# Patient Record
Sex: FEMALE | Race: WHITE | NOT HISPANIC OR LATINO | Employment: UNEMPLOYED | URBAN - METROPOLITAN AREA
[De-identification: names, ages, dates, MRNs, and addresses within clinical notes are randomized per-mention and may not be internally consistent; named-entity substitution may affect disease eponyms.]

---
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
## 2022-09-19 ENCOUNTER — LAB REQUISITION (OUTPATIENT)
Dept: LAB | Facility: CLINIC | Age: 22
End: 2022-09-19
Payer: COMMERCIAL

## 2022-09-19 DIAGNOSIS — N89.8 OTHER SPECIFIED NONINFLAMMATORY DISORDERS OF VAGINA: ICD-10-CM

## 2022-09-19 PROCEDURE — 87491 CHLMYD TRACH DNA AMP PROBE: CPT | Mod: ORL | Performed by: OBSTETRICS & GYNECOLOGY

## 2022-09-20 LAB
C TRACH DNA SPEC QL PROBE+SIG AMP: NEGATIVE
N GONORRHOEA DNA SPEC QL NAA+PROBE: NEGATIVE

## 2023-04-18 ENCOUNTER — HOSPITAL ENCOUNTER (EMERGENCY)
Facility: HOSPITAL | Age: 23
Discharge: HOME | End: 2023-04-19
Attending: EMERGENCY MEDICINE | Admitting: EMERGENCY MEDICINE
Payer: COMMERCIAL

## 2023-04-18 ENCOUNTER — NURSE TRIAGE (OUTPATIENT)
Dept: NURSING | Facility: CLINIC | Age: 23
End: 2023-04-18
Payer: COMMERCIAL

## 2023-04-18 ENCOUNTER — APPOINTMENT (OUTPATIENT)
Dept: CT IMAGING | Facility: HOSPITAL | Age: 23
End: 2023-04-18
Attending: EMERGENCY MEDICINE
Payer: COMMERCIAL

## 2023-04-18 VITALS
WEIGHT: 175 LBS | HEART RATE: 76 BPM | RESPIRATION RATE: 17 BRPM | SYSTOLIC BLOOD PRESSURE: 124 MMHG | DIASTOLIC BLOOD PRESSURE: 83 MMHG | TEMPERATURE: 98.1 F | OXYGEN SATURATION: 98 %

## 2023-04-18 DIAGNOSIS — I10 EPISODE OF HYPERTENSION: ICD-10-CM

## 2023-04-18 DIAGNOSIS — G43.909 MIGRAINE WITHOUT STATUS MIGRAINOSUS, NOT INTRACTABLE, UNSPECIFIED MIGRAINE TYPE: ICD-10-CM

## 2023-04-18 PROBLEM — J30.2 SEASONAL ALLERGIC RHINITIS: Status: ACTIVE | Noted: 2023-04-18

## 2023-04-18 PROBLEM — F43.11 ACUTE POST-TRAUMATIC STRESS DISORDER: Status: ACTIVE | Noted: 2023-04-18

## 2023-04-18 PROBLEM — N94.6 DYSMENORRHEA: Status: ACTIVE | Noted: 2023-04-18

## 2023-04-18 PROBLEM — F32.1 MODERATE MAJOR DEPRESSION, SINGLE EPISODE (H): Status: ACTIVE | Noted: 2023-04-18

## 2023-04-18 PROBLEM — F45.1: Status: ACTIVE | Noted: 2021-07-16

## 2023-04-18 PROBLEM — F93.0 SEPARATION ANXIETY DISORDER: Status: ACTIVE | Noted: 2021-08-09

## 2023-04-18 PROBLEM — F45.9 PSYCHOSOMATIC DISORDER: Status: ACTIVE | Noted: 2023-04-18

## 2023-04-18 PROBLEM — F33.2 SEVERE EPISODE OF RECURRENT MAJOR DEPRESSIVE DISORDER, WITHOUT PSYCHOTIC FEATURES (H): Status: ACTIVE | Noted: 2021-05-05

## 2023-04-18 PROBLEM — R44.3 HALLUCINATIONS: Status: ACTIVE | Noted: 2023-04-18

## 2023-04-18 PROBLEM — F44.5 CONVERSION DISORDER WITH SEIZURES OR CONVULSIONS: Status: ACTIVE | Noted: 2022-08-05

## 2023-04-18 PROBLEM — F60.9 PERSONALITY DISORDER (H): Status: ACTIVE | Noted: 2021-07-15

## 2023-04-18 PROBLEM — G47.30 SLEEP APNEA: Status: ACTIVE | Noted: 2023-04-18

## 2023-04-18 PROBLEM — Z91.51 HISTORY OF ATTEMPTED SUICIDE: Status: ACTIVE | Noted: 2023-04-18

## 2023-04-18 PROBLEM — F33.1 MODERATE EPISODE OF RECURRENT MAJOR DEPRESSIVE DISORDER (H): Status: ACTIVE | Noted: 2022-09-08

## 2023-04-18 LAB
ALBUMIN SERPL BCG-MCNC: 3.8 G/DL (ref 3.5–5.2)
ALP SERPL-CCNC: 70 U/L (ref 35–104)
ALT SERPL W P-5'-P-CCNC: 29 U/L (ref 10–35)
ANION GAP SERPL CALCULATED.3IONS-SCNC: 8 MMOL/L (ref 7–15)
AST SERPL W P-5'-P-CCNC: 23 U/L (ref 10–35)
BASOPHILS # BLD AUTO: 0 10E3/UL (ref 0–0.2)
BASOPHILS NFR BLD AUTO: 0 %
BILIRUB SERPL-MCNC: <0.2 MG/DL
BUN SERPL-MCNC: 10.8 MG/DL (ref 6–20)
CALCIUM SERPL-MCNC: 8.9 MG/DL (ref 8.6–10)
CHLORIDE SERPL-SCNC: 105 MMOL/L (ref 98–107)
CREAT SERPL-MCNC: 0.77 MG/DL (ref 0.51–0.95)
DEPRECATED HCO3 PLAS-SCNC: 27 MMOL/L (ref 22–29)
EOSINOPHIL # BLD AUTO: 0.1 10E3/UL (ref 0–0.7)
EOSINOPHIL NFR BLD AUTO: 2 %
ERYTHROCYTE [DISTWIDTH] IN BLOOD BY AUTOMATED COUNT: 14.1 % (ref 10–15)
GFR SERPL CREATININE-BSD FRML MDRD: >90 ML/MIN/1.73M2
GLUCOSE SERPL-MCNC: 100 MG/DL (ref 70–99)
HCG SERPL QL: NEGATIVE
HCT VFR BLD AUTO: 43 % (ref 35–47)
HGB BLD-MCNC: 14 G/DL (ref 11.7–15.7)
IMM GRANULOCYTES # BLD: 0 10E3/UL
IMM GRANULOCYTES NFR BLD: 0 %
LYMPHOCYTES # BLD AUTO: 3 10E3/UL (ref 0.8–5.3)
LYMPHOCYTES NFR BLD AUTO: 40 %
MCH RBC QN AUTO: 28.5 PG (ref 26.5–33)
MCHC RBC AUTO-ENTMCNC: 32.6 G/DL (ref 31.5–36.5)
MCV RBC AUTO: 88 FL (ref 78–100)
MONOCYTES # BLD AUTO: 0.6 10E3/UL (ref 0–1.3)
MONOCYTES NFR BLD AUTO: 8 %
NEUTROPHILS # BLD AUTO: 3.7 10E3/UL (ref 1.6–8.3)
NEUTROPHILS NFR BLD AUTO: 50 %
NRBC # BLD AUTO: 0 10E3/UL
NRBC BLD AUTO-RTO: 0 /100
PLATELET # BLD AUTO: 489 10E3/UL (ref 150–450)
POTASSIUM SERPL-SCNC: 4.2 MMOL/L (ref 3.4–5.3)
PROT SERPL-MCNC: 6.8 G/DL (ref 6.4–8.3)
RBC # BLD AUTO: 4.91 10E6/UL (ref 3.8–5.2)
SODIUM SERPL-SCNC: 140 MMOL/L (ref 136–145)
TROPONIN T SERPL HS-MCNC: <6 NG/L
WBC # BLD AUTO: 7.5 10E3/UL (ref 4–11)

## 2023-04-18 PROCEDURE — 96374 THER/PROPH/DIAG INJ IV PUSH: CPT

## 2023-04-18 PROCEDURE — 36415 COLL VENOUS BLD VENIPUNCTURE: CPT | Performed by: EMERGENCY MEDICINE

## 2023-04-18 PROCEDURE — 84484 ASSAY OF TROPONIN QUANT: CPT | Performed by: EMERGENCY MEDICINE

## 2023-04-18 PROCEDURE — 99285 EMERGENCY DEPT VISIT HI MDM: CPT | Mod: 25

## 2023-04-18 PROCEDURE — 250N000011 HC RX IP 250 OP 636: Performed by: EMERGENCY MEDICINE

## 2023-04-18 PROCEDURE — 80053 COMPREHEN METABOLIC PANEL: CPT | Performed by: EMERGENCY MEDICINE

## 2023-04-18 PROCEDURE — 85025 COMPLETE CBC W/AUTO DIFF WBC: CPT | Performed by: EMERGENCY MEDICINE

## 2023-04-18 PROCEDURE — 70450 CT HEAD/BRAIN W/O DYE: CPT

## 2023-04-18 PROCEDURE — 96375 TX/PRO/DX INJ NEW DRUG ADDON: CPT

## 2023-04-18 PROCEDURE — 93005 ELECTROCARDIOGRAM TRACING: CPT | Performed by: EMERGENCY MEDICINE

## 2023-04-18 PROCEDURE — 84703 CHORIONIC GONADOTROPIN ASSAY: CPT | Performed by: EMERGENCY MEDICINE

## 2023-04-18 RX ORDER — KETOROLAC TROMETHAMINE 15 MG/ML
15 INJECTION, SOLUTION INTRAMUSCULAR; INTRAVENOUS ONCE
Status: COMPLETED | OUTPATIENT
Start: 2023-04-18 | End: 2023-04-18

## 2023-04-18 RX ORDER — METOCLOPRAMIDE HYDROCHLORIDE 5 MG/ML
5 INJECTION INTRAMUSCULAR; INTRAVENOUS ONCE
Status: COMPLETED | OUTPATIENT
Start: 2023-04-18 | End: 2023-04-18

## 2023-04-18 RX ORDER — DIPHENHYDRAMINE HYDROCHLORIDE 50 MG/ML
12.5 INJECTION INTRAMUSCULAR; INTRAVENOUS ONCE
Status: COMPLETED | OUTPATIENT
Start: 2023-04-18 | End: 2023-04-18

## 2023-04-18 RX ADMIN — METOCLOPRAMIDE 5 MG: 5 INJECTION, SOLUTION INTRAMUSCULAR; INTRAVENOUS at 22:15

## 2023-04-18 RX ADMIN — KETOROLAC TROMETHAMINE 15 MG: 15 INJECTION, SOLUTION INTRAMUSCULAR; INTRAVENOUS at 22:16

## 2023-04-18 RX ADMIN — DIPHENHYDRAMINE HYDROCHLORIDE 12.5 MG: 50 INJECTION, SOLUTION INTRAMUSCULAR; INTRAVENOUS at 22:16

## 2023-04-18 ASSESSMENT — ENCOUNTER SYMPTOMS
PHOTOPHOBIA: 1
NUMBNESS: 1
HEADACHES: 1

## 2023-04-18 ASSESSMENT — ACTIVITIES OF DAILY LIVING (ADL): ADLS_ACUITY_SCORE: 35
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
## 2023-04-19 NOTE — TELEPHONE ENCOUNTER
Mary and her mom calling with B/P 210/145 taken 3 times at her group home and pulse 145. She also has a severe headache and blurred vision.  Care advice is to go to the ED now. Mom and Mary are already at the [facility].      Kristin Burns RN on 4/18/2023 at 9:25 PM      Reason for Disposition    [1] Systolic BP  >= 160 OR Diastolic >= 100 AND [2] cardiac or neurologic symptoms (e.g., chest pain, difficulty breathing, unsteady gait, blurred vision)    Additional Information    Negative: Difficult to awaken or acting confused (e.g., disoriented, slurred speech)    Negative: SEVERE difficulty breathing (e.g., struggling for each breath, speaks in single words)    Negative: [1] Weakness of the face, arm or leg on one side of the body AND [2] new-onset    Negative: [1] Numbness (i.e., loss of sensation) of the face, arm or leg on one side of the body AND [2] new-onset    Negative: [1] Chest pain lasts > 5 minutes AND [2] history of heart disease (i.e., heart attack, bypass surgery, angina, angioplasty, CHF)    Negative: [1] Chest pain AND [2] took nitrogylcerin AND [3] pain was not relieved    Negative: Sounds like a life-threatening emergency to the triager    Protocols used: BLOOD PRESSURE - HIGH-A-

## 2023-04-19 NOTE — DISCHARGE INSTRUCTIONS
Recommend continue your regular at home medications and monitoring of blood pressure at bedtime as usual.  Presently your blood pressures have been within normal limits in the ER and I would not consider medications for blood pressure as it feels likely related to the headache and pain response.

## 2023-04-19 NOTE — ED TRIAGE NOTES
Patient comes in with mother after having had a syncopal episode at her group home. Was sitting in the chair at the time and fell to the floor, hitting her head. Hypertensive at the time. Reports 9/10 headache, some L anterior chest pain and LLE numbness/ tingling. Patient has history of frequent syncopal spells, being evaluated at [place] for cause. Has had x2 syncopal episodes while in waiting room and triage. Lasting from 30 seconds-2 minutes.      Triage Assessment     Row Name 04/18/23 5771       Triage Assessment (Adult)    Airway WDL WDL       Respiratory WDL    Respiratory WDL WDL       Cardiac WDL    Cardiac WDL X;chest pain       Chest Pain Assessment    Chest Pain Location anterior chest, left

## 2023-04-20 LAB
ATRIAL RATE - MUSE: 70 BPM
DIASTOLIC BLOOD PRESSURE - MUSE: 83 MMHG
INTERPRETATION ECG - MUSE: NORMAL
P AXIS - MUSE: 16 DEGREES
PR INTERVAL - MUSE: 148 MS
QRS DURATION - MUSE: 92 MS
QT - MUSE: 392 MS
QTC - MUSE: 423 MS
R AXIS - MUSE: 13 DEGREES
SYSTOLIC BLOOD PRESSURE - MUSE: 124 MMHG
T AXIS - MUSE: 2 DEGREES
VENTRICULAR RATE- MUSE: 70 BPM

## 2023-06-02 ENCOUNTER — HEALTH MAINTENANCE LETTER (OUTPATIENT)
Age: 23
End: 2023-06-02

## 2024-02-01 ENCOUNTER — TRANSFERRED RECORDS (OUTPATIENT)
Dept: MULTI SPECIALTY CLINIC | Facility: CLINIC | Age: 24
End: 2024-02-01

## 2024-02-01 LAB
CHLAMYDIA - HIM PATIENT REPORTED: NORMAL
PAP SMEAR - HIM PATIENT REPORTED: NORMAL

## 2024-03-08 ENCOUNTER — OFFICE VISIT (OUTPATIENT)
Dept: FAMILY MEDICINE | Facility: CLINIC | Age: 24
End: 2024-03-08
Payer: COMMERCIAL

## 2024-03-08 VITALS
WEIGHT: 189 LBS | TEMPERATURE: 98 F | BODY MASS INDEX: 34.78 KG/M2 | HEIGHT: 62 IN | DIASTOLIC BLOOD PRESSURE: 70 MMHG | OXYGEN SATURATION: 98 % | HEART RATE: 93 BPM | RESPIRATION RATE: 15 BRPM | SYSTOLIC BLOOD PRESSURE: 96 MMHG

## 2024-03-08 DIAGNOSIS — B37.31 CANDIDIASIS OF VAGINA: ICD-10-CM

## 2024-03-08 DIAGNOSIS — Z13.220 SCREENING FOR HYPERLIPIDEMIA: ICD-10-CM

## 2024-03-08 DIAGNOSIS — Z00.00 ROUTINE GENERAL MEDICAL EXAMINATION AT A HEALTH CARE FACILITY: Primary | ICD-10-CM

## 2024-03-08 DIAGNOSIS — E66.01 CLASS 2 SEVERE OBESITY DUE TO EXCESS CALORIES WITH SERIOUS COMORBIDITY AND BODY MASS INDEX (BMI) OF 36.0 TO 36.9 IN ADULT (H): ICD-10-CM

## 2024-03-08 DIAGNOSIS — N62 HYPERTROPHY OF BREAST: ICD-10-CM

## 2024-03-08 DIAGNOSIS — E66.812 CLASS 2 SEVERE OBESITY DUE TO EXCESS CALORIES WITH SERIOUS COMORBIDITY AND BODY MASS INDEX (BMI) OF 36.0 TO 36.9 IN ADULT (H): ICD-10-CM

## 2024-03-08 DIAGNOSIS — Z13.1 ENCOUNTER FOR SCREENING FOR DIABETES MELLITUS: ICD-10-CM

## 2024-03-08 DIAGNOSIS — Z76.89 ENCOUNTER TO ESTABLISH CARE WITH NEW DOCTOR: ICD-10-CM

## 2024-03-08 DIAGNOSIS — F33.1 MODERATE EPISODE OF RECURRENT MAJOR DEPRESSIVE DISORDER (H): ICD-10-CM

## 2024-03-08 DIAGNOSIS — N94.6 DYSMENORRHEA: ICD-10-CM

## 2024-03-08 DIAGNOSIS — K21.00 GASTROESOPHAGEAL REFLUX DISEASE WITH ESOPHAGITIS, UNSPECIFIED WHETHER HEMORRHAGE: ICD-10-CM

## 2024-03-08 DIAGNOSIS — N89.8 VAGINAL DISCHARGE: ICD-10-CM

## 2024-03-08 LAB
CLUE CELLS: ABNORMAL
ERYTHROCYTE [DISTWIDTH] IN BLOOD BY AUTOMATED COUNT: 14 % (ref 10–15)
HBA1C MFR BLD: 5 % (ref 0–5.6)
HCT VFR BLD AUTO: 38.4 % (ref 35–47)
HGB BLD-MCNC: 12.7 G/DL (ref 11.7–15.7)
MCH RBC QN AUTO: 27.9 PG (ref 26.5–33)
MCHC RBC AUTO-ENTMCNC: 33.1 G/DL (ref 31.5–36.5)
MCV RBC AUTO: 84 FL (ref 78–100)
PLATELET # BLD AUTO: 412 10E3/UL (ref 150–450)
RBC # BLD AUTO: 4.55 10E6/UL (ref 3.8–5.2)
TRICHOMONAS, WET PREP: ABNORMAL
WBC # BLD AUTO: 9 10E3/UL (ref 4–11)
WBC'S/HIGH POWER FIELD, WET PREP: ABNORMAL
YEAST, WET PREP: PRESENT

## 2024-03-08 PROCEDURE — 99000 SPECIMEN HANDLING OFFICE-LAB: CPT | Performed by: NURSE PRACTITIONER

## 2024-03-08 PROCEDURE — 82306 VITAMIN D 25 HYDROXY: CPT | Performed by: NURSE PRACTITIONER

## 2024-03-08 PROCEDURE — 86364 TISS TRNSGLTMNASE EA IG CLAS: CPT | Performed by: NURSE PRACTITIONER

## 2024-03-08 PROCEDURE — 83520 IMMUNOASSAY QUANT NOS NONAB: CPT | Mod: 90 | Performed by: NURSE PRACTITIONER

## 2024-03-08 PROCEDURE — 80061 LIPID PANEL: CPT | Performed by: NURSE PRACTITIONER

## 2024-03-08 PROCEDURE — 99215 OFFICE O/P EST HI 40 MIN: CPT | Mod: 25 | Performed by: NURSE PRACTITIONER

## 2024-03-08 PROCEDURE — 99385 PREV VISIT NEW AGE 18-39: CPT | Performed by: NURSE PRACTITIONER

## 2024-03-08 PROCEDURE — 85027 COMPLETE CBC AUTOMATED: CPT | Performed by: NURSE PRACTITIONER

## 2024-03-08 PROCEDURE — 36415 COLL VENOUS BLD VENIPUNCTURE: CPT | Performed by: NURSE PRACTITIONER

## 2024-03-08 PROCEDURE — 84443 ASSAY THYROID STIM HORMONE: CPT | Performed by: NURSE PRACTITIONER

## 2024-03-08 PROCEDURE — 83550 IRON BINDING TEST: CPT | Performed by: NURSE PRACTITIONER

## 2024-03-08 PROCEDURE — 82728 ASSAY OF FERRITIN: CPT | Performed by: NURSE PRACTITIONER

## 2024-03-08 PROCEDURE — 80053 COMPREHEN METABOLIC PANEL: CPT | Performed by: NURSE PRACTITIONER

## 2024-03-08 PROCEDURE — 87210 SMEAR WET MOUNT SALINE/INK: CPT | Performed by: NURSE PRACTITIONER

## 2024-03-08 PROCEDURE — 83540 ASSAY OF IRON: CPT | Performed by: NURSE PRACTITIONER

## 2024-03-08 PROCEDURE — 99417 PROLNG OP E/M EACH 15 MIN: CPT | Performed by: NURSE PRACTITIONER

## 2024-03-08 PROCEDURE — 86376 MICROSOMAL ANTIBODY EACH: CPT | Performed by: NURSE PRACTITIONER

## 2024-03-08 PROCEDURE — 83036 HEMOGLOBIN GLYCOSYLATED A1C: CPT | Performed by: NURSE PRACTITIONER

## 2024-03-08 RX ORDER — LOPERAMIDE HCL 2 MG
1 TABLET ORAL DAILY
COMMUNITY
End: 2024-07-10

## 2024-03-08 RX ORDER — PRAZOSIN HYDROCHLORIDE 1 MG/1
1 CAPSULE ORAL AT BEDTIME
COMMUNITY
Start: 2023-03-31 | End: 2024-07-10

## 2024-03-08 RX ORDER — FLUCONAZOLE 150 MG/1
150 TABLET ORAL ONCE
Qty: 1 TABLET | Refills: 0 | Status: SHIPPED | OUTPATIENT
Start: 2024-03-08 | End: 2024-03-08

## 2024-03-08 RX ORDER — NORETHINDRONE ACETATE AND ETHINYL ESTRADIOL AND FERROUS FUMARATE 1MG-20(21)
KIT ORAL
COMMUNITY
Start: 2023-04-08 | End: 2024-03-08

## 2024-03-08 SDOH — HEALTH STABILITY: PHYSICAL HEALTH: ON AVERAGE, HOW MANY DAYS PER WEEK DO YOU ENGAGE IN MODERATE TO STRENUOUS EXERCISE (LIKE A BRISK WALK)?: 0 DAYS

## 2024-03-08 ASSESSMENT — PAIN SCALES - GENERAL: PAINLEVEL: NO PAIN (0)

## 2024-03-08 ASSESSMENT — PATIENT HEALTH QUESTIONNAIRE - PHQ9
10. IF YOU CHECKED OFF ANY PROBLEMS, HOW DIFFICULT HAVE THESE PROBLEMS MADE IT FOR YOU TO DO YOUR WORK, TAKE CARE OF THINGS AT HOME, OR GET ALONG WITH OTHER PEOPLE: EXTREMELY DIFFICULT
SUM OF ALL RESPONSES TO PHQ QUESTIONS 1-9: 24
SUM OF ALL RESPONSES TO PHQ QUESTIONS 1-9: 24

## 2024-03-08 ASSESSMENT — SOCIAL DETERMINANTS OF HEALTH (SDOH): HOW OFTEN DO YOU GET TOGETHER WITH FRIENDS OR RELATIVES?: MORE THAN THREE TIMES A WEEK
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
## 2024-03-08 NOTE — COMMUNITY RESOURCES LIST (ENGLISH)
03/08/2024   [facility]  N/A  For questions about this resource list or additional care needs, please contact your primary care clinic or care manager.  Phone: [phone]   Email: N/A   Address: [address] [place] [id number]   Hours: N/A        Exercise and Recreation       Gym or workout facility  1  [facility] Distance: 2.46 miles      In-Person   [address] [place] [address]  Language: English  Hours: Mon - Fri 5:00 AM - 9:00 PM , Sat - Sun 7:00 AM - 5:00 PM  Fees: Free, Self Pay, Sliding Fee   Phone: [phone] Email: [email or web address] Website: [email or web address]     2  [facility] Distance: 2.7 miles      In-Person   [address] [place] [address]  Language: English  Hours: Mon - Thu 3:00 PM - 5:00 PM  Fees: Free, Self Pay   Phone: [phone] Email: [email or web address]. Website: [email or web address]          Important Numbers & Websites       Emergency Services   911  [facility]   311  Poison Control   [phone]) [phone]  Suicide Prevention Lifeline   (592) 285-2833 (TALK)  Child Abuse Hotline   (471) 263-1435 (4-A-Child)  Sexual Assault Hotline   (475) 861-8007 (HOPE)  National Runaway Safeline   (273) 384-1510 (RUNAWAY)  All-Options Talkline   (398) 782-9446  Substance Abuse Referral   ([phone] (HELP)

## 2024-03-09 LAB
ALBUMIN SERPL BCG-MCNC: 4.4 G/DL (ref 3.5–5.2)
ALP SERPL-CCNC: 86 U/L (ref 40–150)
ALT SERPL W P-5'-P-CCNC: 31 U/L (ref 0–50)
ANION GAP SERPL CALCULATED.3IONS-SCNC: 10 MMOL/L (ref 7–15)
AST SERPL W P-5'-P-CCNC: 26 U/L (ref 0–45)
BILIRUB SERPL-MCNC: 0.2 MG/DL
BUN SERPL-MCNC: 9.4 MG/DL (ref 6–20)
CALCIUM SERPL-MCNC: 9.5 MG/DL (ref 8.6–10)
CHLORIDE SERPL-SCNC: 106 MMOL/L (ref 98–107)
CHOLEST SERPL-MCNC: 173 MG/DL
CREAT SERPL-MCNC: 0.77 MG/DL (ref 0.51–0.95)
DEPRECATED HCO3 PLAS-SCNC: 25 MMOL/L (ref 22–29)
EGFRCR SERPLBLD CKD-EPI 2021: >90 ML/MIN/1.73M2
FASTING STATUS PATIENT QL REPORTED: NORMAL
FERRITIN SERPL-MCNC: 40 NG/ML (ref 6–175)
GLUCOSE SERPL-MCNC: 67 MG/DL (ref 70–99)
HDLC SERPL-MCNC: 50 MG/DL
IRON BINDING CAPACITY (ROCHE): 325 UG/DL (ref 240–430)
IRON SATN MFR SERPL: 14 % (ref 15–46)
IRON SERPL-MCNC: 45 UG/DL (ref 37–145)
LDLC SERPL CALC-MCNC: 97 MG/DL
NONHDLC SERPL-MCNC: 123 MG/DL
POTASSIUM SERPL-SCNC: 4.4 MMOL/L (ref 3.4–5.3)
PROT SERPL-MCNC: 7.9 G/DL (ref 6.4–8.3)
SODIUM SERPL-SCNC: 141 MMOL/L (ref 135–145)
TRIGL SERPL-MCNC: 131 MG/DL
TSH SERPL DL<=0.005 MIU/L-ACNC: 2.34 UIU/ML (ref 0.3–4.2)
VIT D+METAB SERPL-MCNC: 22 NG/ML (ref 20–50)

## 2024-03-09 NOTE — PATIENT INSTRUCTIONS
Preventive Care Advice   This is general advice given by our system to help you stay healthy. However, your care team may have specific advice just for you. Please talk to your care team about your preventive care needs.  Nutrition  Eat 5 or more servings of fruits and vegetables each day.  Try wheat bread, brown rice and whole grain pasta (instead of white bread, rice, and pasta).  Get enough calcium and vitamin D. Check the label on foods and aim for 100% of the RDA (recommended daily allowance).  Lifestyle  Exercise at least 150 minutes each week   (30 minutes a day, 5 days a week).  Do muscle strengthening activities 2 days a week. These help control your weight and prevent disease.  No smoking.  Wear sunscreen to prevent skin cancer.  Have a dental exam and cleaning every 6 months.  Yearly exams  See your health care team every year to talk about:  Any changes in your health.  Any medicines your care team has prescribed.  Preventive care, family planning, and ways to prevent chronic diseases.  Shots (vaccines)   HPV shots (up to age 26), if you've never had them before.  Hepatitis B shots (up to age 59), if you've never had them before.  COVID-19 shot: Get this shot when it's due.  Flu shot: Get a flu shot every year.  Tetanus shot: Get a tetanus shot every 10 years.  Pneumococcal, hepatitis A, and RSV shots: Ask your care team if you need these based on your risk.  Shingles shot (for age 50 and up).  General health tests  Diabetes screening:  Starting at age 35, Get screened for diabetes at least every 3 years.  If you are younger than age 35, ask your care team if you should be screened for diabetes.  Cholesterol test: At age 39, start having a cholesterol test every 5 years, or more often if advised.  Bone density scan (DEXA): At age 50, ask your care team if you should have this scan for osteoporosis (brittle bones).  Hepatitis C: Get tested at least once in your life.  STIs (sexually transmitted  infections)  Before age 24: Ask your care team if you should be screened for STIs.  After age 24: Get screened for STIs if you're at risk. You are at risk for STIs (including HIV) if:  You are sexually active with more than one person.  You don't use condoms every time.  You or a partner was diagnosed with a sexually transmitted infection.  If you are at risk for HIV, ask about PrEP medicine to prevent HIV.  Get tested for HIV at least once in your life, whether you are at risk for HIV or not.  Cancer screening tests  Cervical cancer screening: If you have a cervix, begin getting regular cervical cancer screening tests at age 21. Most people who have regular screenings with normal results can stop after age 65. Talk about this with your provider.  Breast cancer scan (mammogram): If you've ever had breasts, begin having regular mammograms starting at age 40. This is a scan to check for breast cancer.  Colon cancer screening: It is important to start screening for colon cancer at age 45.  Have a colonoscopy test every 10 years (or more often if you're at risk) Or, ask your provider about stool tests like a FIT test every year or Cologuard test every 3 years.  To learn more about your testing options, visit: [email or web address].  For help making a decision, visit: https://bit.ly/ar46533.  Prostate cancer screening test: If you have a prostate and are age 55 to 69, ask your provider if you would benefit from a yearly prostate cancer screening test.  Lung cancer screening: If you are a current or former smoker age 50 to 80, ask your care team if ongoing lung cancer screenings are right for you.  For informational purposes only. Not to replace the advice of your health care provider. Copyright   2023 [employer]. All rights reserved. Clinically reviewed by the [facility] Transitions Program. Wayin 349448 - REV 01/24.    Learning About Stress  What is stress?     Stress is your  body's response to a hard situation. Your body can have a physical, emotional, or mental response. Stress is a fact of life for most people, and it affects everyone differently. What causes stress for you may not be stressful for someone else.  A lot of things can cause stress. You may feel stress when you go on a job interview, take a test, or run a race. This kind of short-term stress is normal and even useful. It can help you if you need to work hard or react quickly. For example, stress can help you finish an important job on time.  Long-term stress is caused by ongoing stressful situations or events. Examples of long-term stress include long-term health problems, ongoing problems at work, or conflicts in your family. Long-term stress can harm your health.  How does stress affect your health?  When you are stressed, your body responds as though you are in danger. It makes hormones that speed up your heart, make you breathe faster, and give you a burst of energy. This is called the fight-or-flight stress response. If the stress is over quickly, your body goes back to normal and no harm is done.  But if stress happens too often or lasts too long, it can have bad effects. Long-term stress can make you more likely to get sick, and it can make symptoms of some diseases worse. If you tense up when you are stressed, you may develop neck, shoulder, or low back pain. Stress is linked to high blood pressure and heart disease.  Stress also harms your emotional health. It can make you bowman, tense, or depressed. Your relationships may suffer, and you may not do well at work or school.  What can you do to manage stress?  You can try these things to help manage stress:   Do something active. Exercise or activity can help reduce stress. Walking is a great way to get started. Even everyday activities such as housecleaning or yard work can help.  Try yoga or rachel chi. These techniques combine exercise and meditation. You may need  some training at first to learn them.  Do something you enjoy. For example, listen to music or go to a movie. Practice your hobby or do volunteer work.  Meditate. This can help you relax, because you are not worrying about what happened before or what may happen in the future.  Do guided imagery. Imagine yourself in any setting that helps you feel calm. You can use online videos, books, or a teacher to guide you.  Do breathing exercises. For example:  From a standing position, bend forward from the waist with your knees slightly bent. Let your arms dangle close to the floor.  Breathe in slowly and deeply as you return to a standing position. Roll up slowly and lift your head last.  Hold your breath for just a few seconds in the standing position.  Breathe out slowly and bend forward from the waist.  Let your feelings out. Talk, laugh, cry, and express anger when you need to. Talking with supportive friends or family, a counselor, or a sebastien leader about your feelings is a healthy way to relieve stress. Avoid discussing your feelings with people who make you feel worse.  Write. It may help to write about things that are bothering you. This helps you find out how much stress you feel and what is causing it. When you know this, you can find better ways to cope.  What can you do to prevent stress?  You might try some of these things to help prevent stress:  Manage your time. This helps you find time to do the things you want and need to do.  Get enough sleep. Your body recovers from the stresses of the day while you are sleeping.  Get support. Your family, friends, and community can make a difference in how you experience stress.  Limit your news feed. Avoid or limit time on social media or news that may make you feel stressed.  Do something active. Exercise or activity can help reduce stress. Walking is a great way to get started.  Where can you learn more?  Go to https://www.healthwise.net/patiented  Enter N032 in the  "search box to learn more about \"Learning About Stress.\"  Current as of: February 26, 2023               Content Version: 13.8    9932-3469 [employer].   Care instructions adapted under license by your healthcare professional. If you have questions about a medical condition or this instruction, always ask your healthcare professional. [employer] disclaims any warranty or liability for your use of this information.      Learning About Depression Screening  What is depression screening?  Depression screening is a way to see if you have depression symptoms. It may be done by a doctor or counselor. It's often part of a routine checkup. That's because your mental health is just as important as your physical health.  Depression is a mental health condition that affects how you feel, think, and act. You may:  Have less energy.  Lose interest in your daily activities.  Feel sad and grouchy for a long time.  Depression is very common. It affects people of all ages.  Many things can lead to depression. Some people become depressed after they have a stroke or find out they have a major illness like cancer or heart disease. The death of a loved one or a breakup may lead to depression. It can run in families. Most experts believe that a combination of inherited genes and stressful life events can cause it.  What happens during screening?  You may be asked to fill out a form about your depression symptoms. You and the doctor will discuss your answers. The doctor may ask you more questions to learn more about how you think, act, and feel.  What happens after screening?  If you have symptoms of depression, your doctor will talk to you about your options.  Doctors usually treat depression with medicines or counseling. Often, combining the two works best. Many people don't get help because they think that they'll get over the depression on their own. But people with depression may not get better unless they " "get treatment.  The cause of depression is not well understood. There may be many factors involved. But if you have depression, it's not your fault.  A serious symptom of depression is thinking about death or suicide. If you or someone you care about talks about this or about feeling hopeless, get help right away.  It's important to know that depression can be treated. Medicine, counseling, and self-care may help.  Where can you learn more?  Go to https://www.GigOwl.net/patiented  Enter T185 in the search box to learn more about \"Learning About Depression Screening.\"  Current as of: June 25, 2023               Content Version: 13.8    5535-1234 [employer].   Care instructions adapted under license by your healthcare professional. If you have questions about a medical condition or this instruction, always ask your healthcare professional. [employer] disclaims any warranty or liability for your use of this information.      Substance Use Disorder: Care Instructions  Overview     You can improve your life and health by stopping your use of alcohol or drugs. When you don't drink or use drugs, you may feel and sleep better. You may get along better with your family, friends, and coworkers. There are medicines and programs that can help with substance use disorder.  How can you care for yourself at home?  Here are some ways to help you stay sober and prevent relapse.  If you have been given medicine to help keep you sober or reduce your cravings, be sure to take it exactly as prescribed.  Talk to your doctor about programs that can help you stop using drugs or drinking alcohol.  Do not keep alcohol or drugs in your home.  Plan ahead. Think about what you'll say if other people ask you to drink or use drugs. Try not to spend time with people who drink or use drugs.  Use the time and money spent on drinking or drugs to do something that's important to you.  Preventing a relapse  Have a plan " to deal with relapse. Learn to recognize changes in your thinking that lead you to drink or use drugs. Get help before you start to drink or use drugs again.  Try to stay away from situations, friends, or places that may lead you to drink or use drugs.  If you feel the need to drink alcohol or use drugs again, seek help right away. Call a trusted friend or family member. Some people get support from organizations such as Narcotics Anonymous or [employer] or from treatment facilities.  If you relapse, get help as soon as you can. Some people make a plan with another person that outlines what they want that person to do for them if they relapse. The plan usually includes how to handle the relapse and who to notify in case of relapse.  Don't give up. Remember that a relapse doesn't mean that you have failed. Use the experience to learn the triggers that lead you to drink or use drugs. Then quit again. Recovery is a lifelong process. Many people have several relapses before they are able to quit for good.  Follow-up care is a key part of your treatment and safety. Be sure to make and go to all appointments, and call your doctor if you are having problems. It's also a good idea to know your test results and keep a list of the medicines you take.  When should you call for help?   Call 911  anytime you think you may need emergency care. For example, call if you or someone else:    Has overdosed or has withdrawal signs. Be sure to tell the emergency workers that you are or someone else is using or trying to quit using drugs. Overdose or withdrawal signs may include:  Losing consciousness.  Seizure.  Seeing or hearing things that aren't there (hallucinations).     Is thinking or talking about suicide or harming others.   Where to get help 24 hours a day, 7 days a week   If you or someone you know talks about suicide, self-harm, a mental health crisis, a substance use crisis, or any other kind of emotional distress, get  "help right away. You can:    Call the Suicide and Crisis Lifeline at 988.     Call 5-602-350-HIDH (1-385.881.3450).     Text HOME to 462348 to access the Crisis Text Line.   Consider saving these numbers in your phone.  Go to [facility] for more information or to chat online.  Call your doctor now or seek immediate medical care if:    You are having withdrawal symptoms. These may include nausea or vomiting, sweating, shakiness, and anxiety.   Watch closely for changes in your health, and be sure to contact your doctor if:    You have a relapse.     You need more help or support to stop.   Where can you learn more?  Go to https://www.PowerbyProxi.net/patiented  Enter H573 in the search box to learn more about \"Substance Use Disorder: Care Instructions.\"  Current as of: March 21, 2023               Content Version: 13.8    6315-0788 [employer].   Care instructions adapted under license by your healthcare professional. If you have questions about a medical condition or this instruction, always ask your healthcare professional. Healthwise, [employer] disclaims any warranty or liability for your use of this information.      "

## 2024-03-11 LAB
THYROPEROXIDASE AB SERPL-ACNC: <10 IU/ML
TSH RECEP AB SER-ACNC: <1.1 IU/L (ref 0–1.75)
TTG IGA SER-ACNC: 0.6 U/ML
TTG IGG SER-ACNC: <0.6 U/ML

## 2024-03-18 NOTE — TELEPHONE ENCOUNTER
FUTURE VISIT INFORMATION      FUTURE VISIT INFORMATION:  Date: 4/30/24  Time: 9:30am  Location: [facility]  REFERRAL INFORMATION:  Referring provider:  Hai COLLIER CNP   Referring providers clinic:  [facility]   Reason for visit/diagnosis  Hypertrophy of breast, breast reduction    RECORDS REQUESTED FROM:       Clinic name Comments Records Status Imaging Status   [facility]  Ov/referral 3/8/24 EPIC

## 2024-03-19 PROCEDURE — 87338 HPYLORI STOOL AG IA: CPT | Performed by: NURSE PRACTITIONER

## 2024-03-20 ENCOUNTER — OFFICE VISIT (OUTPATIENT)
Dept: FAMILY MEDICINE | Facility: CLINIC | Age: 24
End: 2024-03-20
Payer: COMMERCIAL

## 2024-03-20 VITALS
OXYGEN SATURATION: 98 % | HEART RATE: 96 BPM | DIASTOLIC BLOOD PRESSURE: 68 MMHG | TEMPERATURE: 97.8 F | BODY MASS INDEX: 34.6 KG/M2 | WEIGHT: 188 LBS | RESPIRATION RATE: 16 BRPM | SYSTOLIC BLOOD PRESSURE: 110 MMHG | HEIGHT: 62 IN

## 2024-03-20 DIAGNOSIS — D22.9 BENIGN SKIN MOLE: Primary | ICD-10-CM

## 2024-03-20 DIAGNOSIS — R06.83 LOUD SNORING: ICD-10-CM

## 2024-03-20 DIAGNOSIS — B36.9 FUNGAL RASH OF TORSO: ICD-10-CM

## 2024-03-20 PROCEDURE — 99214 OFFICE O/P EST MOD 30 MIN: CPT

## 2024-03-20 RX ORDER — CLOTRIMAZOLE AND BETAMETHASONE DIPROPIONATE 10; .64 MG/G; MG/G
CREAM TOPICAL 2 TIMES DAILY
Qty: 45 G | Refills: 0 | Status: SHIPPED | OUTPATIENT
Start: 2024-03-20 | End: 2024-04-03

## 2024-03-20 NOTE — PATIENT INSTRUCTIONS
The rash for your past psych is caused by a fungus.  I prescribed a topical medication called Lotrisone that I would like you to begin using the site.  You can use it twice daily on clean dry skin until the rash resolves.  I do not want you to use it consecutively for more than 2 weeks, so after 2 weeks of use please give yourself at least a 1 week break, and you can resume using it for another 2 weeks if the rash is still present.    Fungal rashes are often difficult to maintain the more moist environments, skin is very important to keep these areas of your body clean and dry.  You can achieve this by using topical drying products such as Interdry, applying deodorant to the area between your breasts, or ensuring that you are patting the area dry frequently throughout the day if you are becoming sweaty.    I believe that the rash near your tattoo is caused a skin reaction related to the ink used.  You can use the Lotrisone that I prescribed on this area as well, as it does include a low-dose of steroids.    I have placed a referral to sleep medicine so that you can be evaluated for sleep apnea.    I have also placed a referral to dermatology so that you can have your blood levels assessed and possibly removed.    Please follow-up with your primary care provider at your next annual wellness visit or sooner with any new or changing symptoms    Seek immediate medical attention for symptoms including chest pain, shortness of breath, fever, swelling or drainage, rash, or any nausea or vomiting.

## 2024-03-20 NOTE — PROGRESS NOTES
Assessment & Plan     (D22.9) Benign skin mole  (primary encounter diagnosis)  Comment: Moles have been present for a number of years.  Patient is interested in having these removed.  Diabetes mellitus look particularly suspicious or concerning, but seem to be bothering the patient more cosmetically.  Plan to refer to dermatology for ongoing assessment and management  Plan: Adult Dermatology  Referral    (B36.9) Fungal rash of torso  Comment: Acute. No signs of respiratory distress, vital signs stable, and no red flag signs requiring immediate need for medical attention.  HPI and physical exam findings are in line with a diagnosis of: Rash.  Will prescribe Lotrisone to be used twice daily until the rash is resolved.  Very important to keep the site clean and dry to avoid clinically rash concerns.  Discussed patient's weight and breast size as a possible cause for rash eruption.  Would reconsider need for different topical or oral product if this plan of care was not successful in managing her concerns.  Plan: Adult Dermatology  Referral,         clotrimazole-betamethasone (LOTRISONE) 1-0.05 %        external cream    (R06.83) Loud snoring  Comment: Patient has a longstanding history of sleep apnea, but she does not currently use a BiPAP or CPAP machine.  Mom notes that patient snores very loudly, and she has observed her having apneic spells throughout the night.  Like more formal sleep apnea testing done.  Referral placed for ongoing assessment and management of this concern should be addressed by sleep medicine.  Plan: Adult Sleep Eval & Management          Referral      Prescription drug management  I spent a total of 38 minutes on the day of the visit.   Time spent by me doing chart review, history and exam, documentation and further activities per the note      CONSULTATION/REFERRAL to sleep medicine and dermatology  FUTURE APPOINTMENTS:       - Follow-up for annual visit or as  needed  Patient Instructions   The rash for your past psych is caused by a fungus.  I prescribed a topical medication called Lotrisone that I would like you to begin using the site.  You can use it twice daily on clean dry skin until the rash resolves.  I do not want you to use it consecutively for more than 2 weeks, so after 2 weeks of use please give yourself at least a 1 week break, and you can resume using it for another 2 weeks if the rash is still present.    Fungal rashes are often difficult to maintain the more moist environments, skin is very important to keep these areas of your body clean and dry.  You can achieve this by using topical drying products such as Interdry, applying deodorant to the area between your breasts, or ensuring that you are patting the area dry frequently throughout the day if you are becoming sweaty.    I believe that the rash near your tattoo is caused a skin reaction related to the ink used.  You can use the Lotrisone that I prescribed on this area as well, as it does include a low-dose of steroids.    I have placed a referral to sleep medicine so that you can be evaluated for sleep apnea.    I have also placed a referral to dermatology so that you can have your blood levels assessed and possibly removed.    Please follow-up with your primary care provider at your next annual wellness visit or sooner with any new or changing symptoms    Seek immediate medical attention for symptoms including chest pain, shortness of breath, fever, swelling or drainage, rash, or any nausea or vomiting.    Kami Hernandez is a 23 year old, presenting for the following health issues:  Derm Problem (Overall body itchy rash and spots that started x 3 weeks )      3/20/2024     8:54 AM   Additional Questions   Roomed by MERRICK Gil   Accompanied by Misty Mckeon     ANA Hernandez is a 23-year-old female with a past medical history significant for lower abdominal pain, dysmenorrhea, seasonal  allergies, sleep apnea PTSD, ADHD, learning disorder, conversion disorder with seizures, depression, anxiety, personality disorder, and hallucinations presents today accompanied by her mother with concerns for new onset skin rash, need for sleep evaluation, and desire for assessment of various moles.     Rash  Patient reports that she got a tattoo number of months ago, and shortly after began having a very itchy rash across the entire tattoo.  She noted that over the coming weeks, the rash began to spread to the side of her breast, underneath both of her breasts, very diffusely over parts of her left lower.  She reports that the rash is very itchy, but declines that it is swollen, hot, open, or draining.  She declines that it is painful.  She declines the use of any new topical products such as lotions, body washes, or discharge.  Patient.  That the patient took a course of Diflucan 100 mg x 3 over the course of 9 days for yeast infection, and we are hopeful that this would help manage the rash, but it did not.  They will also tried clotrimazole ointment on it, which is also helpful.  Patient feels that the rash seems to be spreading as time goes on.  She declines any other signs of systemic illness including fever, chills, body aches, or current or recent upper respiratory illness.    Sleep apnea  Patient reports that she was diagnosed with sleep apnea as a child, she has always been a very loud snorer.  She was assessed by ENT, and her adenoids were removed, but her tonsils were normal.  Mom reports that the strain seems to be worsening, and patient does have episodes of apneic spells at night.  Patient reports that she is very fatigued throughout the day, and never generally feels like she gets a good night sleep.  Patient and family are requesting a more formal sleep study to be done, and are hoping for a referral to be made    Moles  Patient presents with a complaint of a number raised moles across her back.   "She notes that she has had these for years, they have not necessarily changed, but they do bother her.  She was recently seen by her primary care provider in the clinic, but was not able to get to a couple of her concerns, and is the room of time.  Patient reports that she is hopeful for referral to dermatology, and she would like to discuss having these moles removed.        Review of Systems  Constitutional, HEENT, cardiovascular, pulmonary, gi and gu systems are negative, except as otherwise noted.      Objective    /68 (BP Location: Left arm, Patient Position: Sitting, Cuff Size: Adult Regular)   Pulse 96   Temp 97.8  F (36.6  C) (Tympanic)   Resp 16   Ht 1.575 m (5' 2\")   Wt 85.3 kg (188 lb)   SpO2 98%   BMI 34.39 kg/m    Body mass index is 34.39 kg/m .  Physical Exam   GENERAL: alert and no distress  NECK: no adenopathy, no asymmetry, masses, or scars  RESP: lungs clear to auscultation - no rales, rhonchi or wheezes  CV: regular rate and rhythm, normal S1 S2, no S3 or S4, no murmur, click or rub, no peripheral edema  ABDOMEN: soft, nontender, no hepatosplenomegaly, no masses and bowel sounds normal  MS: no gross musculoskeletal defects noted, no edema  SKIN: Erythematous maculopapular scaly rash right upper arm that is about 1.5 cm in diameter with about 5 small satellite lesions surrounding the area.  Erythematous rash inframammary line to bilateral breasts with satellite lesions throughout inframammary fold.  Diffuse satellite lesions across left lower forearm    Brianna Sosa DNP FNP-C  Family Nurse Practitioner - Same Day Provider  [facility] [place]      Signed Electronically by: NANDO Flores CNP    "

## 2024-03-21 LAB — H PYLORI AG STL QL IA: NEGATIVE

## 2024-03-25 NOTE — TELEPHONE ENCOUNTER
FUTURE VISIT INFORMATION      FUTURE VISIT INFORMATION:  Date: 3/26/2024  Time: 9:15AM  Location:  [facility] [address] [place] [address]   REFERRAL INFORMATION:  Referring provider:  Hai Lassiter APRN CNP   Referring providers clinic:  [facility]   Reason for visit/diagnosis  Hypertrophy of breast     RECORDS REQUESTED FROM:       Clinic name Comments Records Status Imaging Status   [facility]  Ov/referral 3/8/24 EPIC

## 2024-03-26 ENCOUNTER — PRE VISIT (OUTPATIENT)
Dept: PLASTIC SURGERY | Facility: CLINIC | Age: 24
End: 2024-03-26
Payer: COMMERCIAL

## 2024-03-26 ENCOUNTER — OFFICE VISIT (OUTPATIENT)
Dept: PLASTIC SURGERY | Facility: CLINIC | Age: 24
End: 2024-03-26
Attending: NURSE PRACTITIONER
Payer: COMMERCIAL

## 2024-03-26 VITALS
TEMPERATURE: 97.6 F | HEIGHT: 62 IN | HEART RATE: 94 BPM | BODY MASS INDEX: 35.46 KG/M2 | WEIGHT: 192.7 LBS | OXYGEN SATURATION: 98 % | SYSTOLIC BLOOD PRESSURE: 119 MMHG | DIASTOLIC BLOOD PRESSURE: 72 MMHG | RESPIRATION RATE: 16 BRPM

## 2024-03-26 DIAGNOSIS — N62 HYPERTROPHY OF BREAST: ICD-10-CM

## 2024-03-26 PROCEDURE — G0463 HOSPITAL OUTPT CLINIC VISIT: HCPCS | Performed by: PLASTIC SURGERY

## 2024-03-26 PROCEDURE — 99205 OFFICE O/P NEW HI 60 MIN: CPT | Performed by: PLASTIC SURGERY

## 2024-03-26 RX ORDER — CEFAZOLIN SODIUM 2 G/50ML
2 SOLUTION INTRAVENOUS SEE ADMIN INSTRUCTIONS
Status: CANCELLED | OUTPATIENT
Start: 2024-03-26

## 2024-03-26 RX ORDER — CEFAZOLIN SODIUM 2 G/50ML
2 SOLUTION INTRAVENOUS
Status: CANCELLED | OUTPATIENT
Start: 2024-03-26

## 2024-03-26 ASSESSMENT — PAIN SCALES - GENERAL: PAINLEVEL: MODERATE PAIN (4)

## 2024-03-26 NOTE — LETTER
3/26/2024         RE: Mary Peter  [address]  [place] [address]        Dear Colleague,    Thank you for referring your patient, Mary Peter, to the [facility]. Please see a copy of my visit note below.    Referring Provider:  NANDO Fung CNP  [address]  [address]  [place] [address]     Primary Care Provider:  Hai Lassiter      RE: Mary Scott Brittani.  : 2000.  RODRIGO: 3/26/2024.    Reason for visit: Breast reduction    HPI: The patient is interested in breast reduction.  She has been large breasted all of her adolescent and adult life.  She wears an H cup.  She wants to be a C to D cup.  About 75% smaller.  She has no family history of breast cancer.  She has a lot of upper back, neck, shoulder pain, shoulder grooving from bra straps, inframammary fold rashes during summers, and this is all despite conservative measures like supportive garments and over-the-counter pain medications.    Medical history:  PTSD, depression, GERD    Surgical history:  Bilateral wrist ganglion cystectomy    Family history:  No family history on file.    Medications:  Current Outpatient Medications   Medication Sig Dispense Refill    clotrimazole-betamethasone (LOTRISONE) 1-0.05 % external cream Apply topically 2 times daily for 14 days 45 g 0    Escitalopram Oxalate (LEXAPRO PO) Take 30 mg by mouth daily      Multiple Vitamins-Minerals (SM HAIR/SKIN/NAILS) TABS Take 1 tablet by mouth daily      omeprazole (PRILOSEC) 20 MG DR capsule Take 1 capsule (20 mg) by mouth 2 times daily for 56 days 112 capsule 0    prazosin (MINIPRESS) 1 MG capsule Take 1 mg by mouth at bedtime         Allergies:  No Known Allergies    Social history:   Social History     Tobacco Use    Smoking status: Never    Smokeless tobacco: Never   Substance Use Topics    Alcohol use: Never         Physical Examination:  /72 (BP Location: Right arm, Patient Position: Sitting,  "Cuff Size: Adult Regular)   Pulse 94   Temp 97.6  F (36.4  C) (Oral)   Resp 16   Ht 1.575 m (5' 2.01\")   Wt 87.4 kg (192 lb 11.2 oz)   SpO2 98%   BMI 35.24 kg/m    Body mass index is 35.24 kg/m .  BSA: 1.  8 8 m   Schnur Scale: 530 g    General: No acute distress.    Breasts: Bilateral grade 3 ptosis, right side is longer and larger than the left, sternal notch to nipple distance under 40 cm.        ASSESMENT and PLAN:    Based upon the above findings, a diagnosis of symptomatic bilateral breast hypertrophy was made. I had a neftali, detailed discussion with the patient in the presence of my nurse (who was present from beginning to end) about breast reduction and the proposed surgery. I was very clear and detailed about overview of surgery, perioperative plans, and expectations. I went over the facts that this surgery would lead to scars that are permanent (showed her where they would be on pictures and her body), potential loss of nipple and breast sensation, potential inability to breastfeed, that asymmetries are guaranteed, the inability to promise cup sizes or exact sizes post-operatively, and potential rehypertrophy in the future. All risks, benefits and alternatives of the surgery including but not limited to pain, infection, bleeding, scarring, asymmetry, seromas, hematomas, wound breakdown, wound dehiscence, prominent scar, hypertrophic scar, keloid scar, requirement of further surgeries, skin necrosis, fat necrosis, nipple necrosis, T-junction site necrosis, requirement of free nipple grafts, DVT, PE, MI, CVA, pneumonia, renal failure and death, were explained in detail. She agreed she understood them all and had all her questions answered to her satisfaction. The realities of insurance companies and requirement of prior authorization were also explained. Based on her Schnur scale, 530 g needs to be removed from each breast. We felt that we could potentially achieve her stated cup/reduction goal with " either this amount or greater. Our plan now is to proceed with a prior authorization, give her quotes for the surgery and proceed with the surgery.     I discussed with the patient and her mother that she meet with her psychiatrist/psychologist to discuss the psychological/stress implications of surgery given her significant PTSD.    She also understood the real risk of requiring a free nipple graft.  She stated that she would like a free nipple graft if it came to that.    All her questions were answered. She was happy with the visit. I look forward to helping her out in the near future as indicated.       Total time spent in the encounter today including chart review, visit itself, and post-visit paperwork was 60 minutes.       Naya Bourne MD    Chief, Division of Plastic Surgery  Department of Surgery  [facility]      CC: NANDO Fung CNP  [address] [place] [address]  CC: Hai Lassiter

## 2024-03-26 NOTE — PROGRESS NOTES
"Referring Provider:  NANDO Fung CNP  [address]  [place] [address]     Primary Care Provider:  Hai Lassiter      RE: Mary Chavez Brittani.  : 2000.  RODRIGO: 3/26/2024.    Reason for visit: Breast reduction    HPI: The patient is interested in breast reduction.  She has been large breasted all of her adolescent and adult life.  She wears an H cup.  She wants to be a C to D cup.  About 75% smaller.  She has no family history of breast cancer.  She has a lot of upper back, neck, shoulder pain, shoulder grooving from bra straps, inframammary fold rashes during summers, and this is all despite conservative measures like supportive garments and over-the-counter pain medications.    Medical history:  PTSD, depression, GERD    Surgical history:  Bilateral wrist ganglion cystectomy    Family history:  No family history on file.    Medications:  Current Outpatient Medications   Medication Sig Dispense Refill    clotrimazole-betamethasone (LOTRISONE) 1-0.05 % external cream Apply topically 2 times daily for 14 days 45 g 0    Escitalopram Oxalate (LEXAPRO PO) Take 30 mg by mouth daily      Multiple Vitamins-Minerals (SM HAIR/SKIN/NAILS) TABS Take 1 tablet by mouth daily      omeprazole (PRILOSEC) 20 MG DR capsule Take 1 capsule (20 mg) by mouth 2 times daily for 56 days 112 capsule 0    prazosin (MINIPRESS) 1 MG capsule Take 1 mg by mouth at bedtime         Allergies:  No Known Allergies    Social history:   Social History     Tobacco Use    Smoking status: Never    Smokeless tobacco: Never   Substance Use Topics    Alcohol use: Never         Physical Examination:  /72 (BP Location: Right arm, Patient Position: Sitting, Cuff Size: Adult Regular)   Pulse 94   Temp 97.6  F (36.4  C) (Oral)   Resp 16   Ht 1.575 m (5' 2.01\")   Wt 87.4 kg (192 lb 11.2 oz)   SpO2 98%   BMI 35.24 kg/m    Body mass index is 35.24 kg/m .  BSA: 1.  8 8 m   Schnur Scale: 530 g    General: No acute " distress.    Breasts: Bilateral grade 3 ptosis, right side is longer and larger than the left, sternal notch to nipple distance under 40 cm.        ASSESMENT and PLAN:    Based upon the above findings, a diagnosis of symptomatic bilateral breast hypertrophy was made. I had a neftali, detailed discussion with the patient in the presence of my nurse (who was present from beginning to end) about breast reduction and the proposed surgery. I was very clear and detailed about overview of surgery, perioperative plans, and expectations. I went over the facts that this surgery would lead to scars that are permanent (showed her where they would be on pictures and her body), potential loss of nipple and breast sensation, potential inability to breastfeed, that asymmetries are guaranteed, the inability to promise cup sizes or exact sizes post-operatively, and potential rehypertrophy in the future. All risks, benefits and alternatives of the surgery including but not limited to pain, infection, bleeding, scarring, asymmetry, seromas, hematomas, wound breakdown, wound dehiscence, prominent scar, hypertrophic scar, keloid scar, requirement of further surgeries, skin necrosis, fat necrosis, nipple necrosis, T-junction site necrosis, requirement of free nipple grafts, DVT, PE, MI, CVA, pneumonia, renal failure and death, were explained in detail. She agreed she understood them all and had all her questions answered to her satisfaction. The realities of insurance companies and requirement of prior authorization were also explained. Based on her Schnur scale, 530 g needs to be removed from each breast. We felt that we could potentially achieve her stated cup/reduction goal with either this amount or greater. Our plan now is to proceed with a prior authorization, give her quotes for the surgery and proceed with the surgery.     I discussed with the patient and her mother that she meet with her psychiatrist/psychologist to discuss the  psychological/stress implications of surgery given her significant PTSD.    She also understood the real risk of requiring a free nipple graft.  She stated that she would like a free nipple graft if it came to that.    All her questions were answered. She was happy with the visit. I look forward to helping her out in the near future as indicated.       Total time spent in the encounter today including chart review, visit itself, and post-visit paperwork was 60 minutes.       Naya Bourne MD    Chief, Division of Plastic Surgery  Department of Surgery  [facility]      CC: NANDO Fung CNP  [address] [place] [address]  CC: Hai Lassiter

## 2024-03-26 NOTE — NURSING NOTE
"Oncology Rooming Note    March 26, 2024 9:31 AM   Mary Peter is a 23 year old female who presents for:    Chief Complaint   Patient presents with    Oncology Clinic Visit     Hypertrophy of breast     Initial Vitals: /72 (BP Location: Right arm, Patient Position: Sitting, Cuff Size: Adult Regular)   Pulse 94   Temp 97.6  F (36.4  C) (Oral)   Resp 16   Ht 1.575 m (5' 2.01\")   Wt 87.4 kg (192 lb 11.2 oz)   SpO2 98%   BMI 35.24 kg/m   Estimated body mass index is 35.24 kg/m  as calculated from the following:    Height as of this encounter: 1.575 m (5' 2.01\").    Weight as of this encounter: 87.4 kg (192 lb 11.2 oz). Body surface area is 1.96 meters squared.  Moderate Pain (4) Comment: worsens w movement   No LMP recorded. (Menstrual status: Birth Control).  Allergies reviewed: Yes  Medications reviewed: Yes    Medications: Medication refills not needed today.  Pharmacy name entered into [facility]:    [id number] - [place], [place] - [address] [id number] - [place], [place] - [address]    Frailty Screening:   Is the patient here for a new oncology consult visit in cancer care? 2. No      Clinical concerns: none       Lisbeth Ramirez              "

## 2024-04-02 ENCOUNTER — OFFICE VISIT (OUTPATIENT)
Dept: DERMATOLOGY | Facility: CLINIC | Age: 24
End: 2024-04-02
Payer: COMMERCIAL

## 2024-04-02 DIAGNOSIS — L40.4 GUTTATE PSORIASIS: Primary | ICD-10-CM

## 2024-04-02 DIAGNOSIS — D22.9 BENIGN SKIN MOLE: ICD-10-CM

## 2024-04-02 DIAGNOSIS — D49.2 NEOPLASM OF UNSPECIFIED BEHAVIOR OF BONE, SOFT TISSUE, AND SKIN: ICD-10-CM

## 2024-04-02 DIAGNOSIS — L30.4 INTERTRIGO: ICD-10-CM

## 2024-04-02 PROCEDURE — 88305 TISSUE EXAM BY PATHOLOGIST: CPT | Mod: TC | Performed by: PHYSICIAN ASSISTANT

## 2024-04-02 PROCEDURE — 99204 OFFICE O/P NEW MOD 45 MIN: CPT | Mod: 25 | Performed by: PHYSICIAN ASSISTANT

## 2024-04-02 PROCEDURE — 11103 TANGNTL BX SKIN EA SEP/ADDL: CPT | Performed by: PHYSICIAN ASSISTANT

## 2024-04-02 PROCEDURE — 88305 TISSUE EXAM BY PATHOLOGIST: CPT | Mod: 26 | Performed by: DERMATOLOGY

## 2024-04-02 PROCEDURE — 11102 TANGNTL BX SKIN SINGLE LES: CPT | Performed by: PHYSICIAN ASSISTANT

## 2024-04-02 RX ORDER — CLOBETASOL PROPIONATE 0.5 MG/G
OINTMENT TOPICAL
Qty: 30 G | Refills: 4 | Status: SHIPPED | OUTPATIENT
Start: 2024-04-02

## 2024-04-02 RX ORDER — CALCIPOTRIENE 50 UG/G
CREAM TOPICAL 2 TIMES DAILY
Qty: 60 G | Refills: 4 | Status: SHIPPED | OUTPATIENT
Start: 2024-04-02 | End: 2024-07-10

## 2024-04-02 ASSESSMENT — PAIN SCALES - GENERAL: PAINLEVEL: NO PAIN (0)
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
## 2024-04-02 NOTE — PATIENT INSTRUCTIONS
Wound Care After a Biopsy    What is a skin biopsy?  A skin biopsy allows the doctor to examine a very small piece of tissue under the microscope to determine the diagnosis and the best treatment for the skin condition. A local anesthetic (numbing medicine) is injected with a very small needle into the skin area to be tested. A small piece of skin is taken from the area. Sometimes a suture (stitch) is used.     What are the risks of a skin biopsy?  I will experience scar, bleeding, swelling, pain, crusting and redness. I may experience incomplete removal or recurrence. Risks of this procedure are excessive bleeding, bruising, infection, nerve damage, numbness, thick (hypertrophic or keloidal) scar and non-diagnostic biopsy.    How should I care for my wound for the first 24 hours?  Keep the wound dry and covered for 24 hours  If it bleeds, hold direct pressure on the area for 15 minutes. If bleeding does not stop, call us or go to the emergency room  Avoid strenuous exercise the first 1-2 days or as your doctor instructs you    How should I care for the wound after 24 hours?  After 24 hours, remove the bandage  You may bathe or shower as normal  If you had a scalp biopsy, you can shampoo as usual and can use shower water to clean the biopsy site daily  Clean the wound once a day with gentle soap and water  Do not scrub, be gentle  Apply white petroleum/Vaseline after cleaning the wound with a cotton swab or a clean finger, and keep the site covered with a Bandaid /bandage. Bandages are not necessary with a scalp biopsy  If you are unable to cover the site with a Bandaid /bandage, re-apply ointment 2-3 times a day to keep the site moist. Moisture will help with healing  Avoid strenuous activity for first 1-2 days  Avoid lakes, rivers, pools, and oceans until the stitches are removed or the site is healed    How do I clean my wound?  Wash hands thoroughly with soap or use hand  before all wound care  Clean  the wound with gentle soap and water  Apply white petroleum/Vaseline  to wound after it is clean  Replace the Bandaid /bandage to keep the wound covered for the first few days or as instructed by your doctor  If you had a scalp biopsy, warm shower water to the area on a daily basis should suffice    What should I use to clean my wound?   Cotton-tipped applicators (Qtips )  White petroleum jelly (Vaseline ). Use a clean new container and use Q-tips to apply.  Bandaids  as needed  Gentle soap     How should I care for my wound long term?  Do not get your wound dirty  Keep up with wound care for one week or until the area is healed.  If you have stitches, stitches need to be removed in 14 days. You may return to our clinic for this or you may have it done locally at your doctor s office.  A small scab will form and fall off by itself when the area is completely healed. The area will be red and will become pink in color as it heals. Sun protection is very important for how your scar will turn out. Sunscreen with an SPF 30 or greater is recommended once the area is healed.  You should have some soreness but it should be mild and slowly go away over several days. Talk to your doctor about using tylenol for pain,    When should I call my doctor?  If you have increased:   Pain or swelling  Pus or drainage (clear or slightly yellow drainage is ok)  Temperature over 100F  Spreading redness or warmth around wound    When will I hear about my results?  The biopsy results can take 2 weeks to come back.  Your results will automatically release to [facility] before your provider has even reviewed them.  The clinic will call you with the results, send you a [facility] message, or have you schedule a follow-up clinic or phone time to discuss the results.  Contact our clinics if you do not hear from us in 2 weeks.    Who should I call with questions?  [facility]: [phone]  [facility]  [facility]: [phone]  For urgent needs outside of business hours call the [facility] at [phone] and ask for the dermatology resident on call
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
## 2024-04-02 NOTE — NURSING NOTE
Lidocaine-epinephrine 1-1:407489 % injection   1.5mL once for one use, starting 4/2/2024 ending 4/2/2024,  2mL disp, R-0, injection  Injected by Halina RUBIN CMA

## 2024-04-02 NOTE — NURSING NOTE
Dermatology Rooming Note    Mary Peter's goals for this visit include:   Chief Complaint   Patient presents with    Derm Problem     Mary states she is flaring with spots throughout the whole body. She is also concerned about multiple moles.     Halina RUBIN CMA

## 2024-04-03 LAB
PATH REPORT.COMMENTS IMP SPEC: NORMAL
PATH REPORT.COMMENTS IMP SPEC: NORMAL
PATH REPORT.FINAL DX SPEC: NORMAL
PATH REPORT.GROSS SPEC: NORMAL
PATH REPORT.MICROSCOPIC SPEC OTHER STN: NORMAL
PATH REPORT.RELEVANT HX SPEC: NORMAL

## 2024-04-10 NOTE — RESULT ENCOUNTER NOTE
Michel Hernandez,    Your recent results are normal. Any results slightly above or below the normal range have been evaluated as clinically stable.     Let me know if you have any questions or concerns.    Sincerely,  NANDO Fung CNP

## 2024-04-13 ENCOUNTER — TRANSFERRED RECORDS (OUTPATIENT)
Dept: HEALTH INFORMATION MANAGEMENT | Facility: CLINIC | Age: 24
End: 2024-04-13

## 2024-04-15 ENCOUNTER — TELEPHONE (OUTPATIENT)
Dept: PLASTIC SURGERY | Facility: CLINIC | Age: 24
End: 2024-04-15
Payer: COMMERCIAL

## 2024-04-15 NOTE — TELEPHONE ENCOUNTER
LVM for patient regarding scheduling surgery with Dr. Bourne.    Left call back [phone]    Brando Davis on 4/15/2024 at 3:55 PM

## 2024-04-17 ENCOUNTER — HOSPITAL ENCOUNTER (OUTPATIENT)
Facility: CLINIC | Age: 24
End: 2024-04-17
Attending: PLASTIC SURGERY | Admitting: PLASTIC SURGERY
Payer: COMMERCIAL

## 2024-04-17 PROBLEM — N62 HYPERTROPHY OF BREAST: Status: ACTIVE | Noted: 2024-03-26

## 2024-04-17 NOTE — TELEPHONE ENCOUNTER
Received voicemail from patient on 4/16 and 4/17 regarding scheduling surgery   __    Nancy Marcum on 4/17/2024 at 12:21 PM  [phone]

## 2024-04-17 NOTE — TELEPHONE ENCOUNTER
Called and spoke with patient regarding scheduling surgery with Dr. Bourne.    Surgery Date: 7/26/24    Location: [facility]    Return Consult: 7/10/24, 8:30 AM with Dr. Bourne in [facility] [place]    H&P: PAC 7/10/24, 9:30 AM    Post-op: 8/14/24, 8:30 AM with Dr. Bourne in [place]    Surgery Packet: [employer] and email ([email or web address])    Patient was informed, pre-op RN team will call 2-3 days prior to surgery with arrival time and instructions.    Patient had no further questions at this time.    Brando Davis on 4/17/2024 at 3:38 PM

## 2024-04-18 NOTE — TELEPHONE ENCOUNTER
FUTURE VISIT INFORMATION      SURGERY INFORMATION:  Date: 24  Location:  OR  Surgeon:  ELIAN Bourne MD   Anesthesia Type:  general with block  Procedure: MAMMOPLASTY, REDUCTION, BILATERAL   Consult: ov 3/26/24    RECORDS REQUESTED FROM:       Primary Care Provider: [facility]    Most recent EKG+ Tracin23

## 2024-04-30 ENCOUNTER — PRE VISIT (OUTPATIENT)
Dept: PLASTIC SURGERY | Facility: CLINIC | Age: 24
End: 2024-04-30
Payer: COMMERCIAL

## 2024-06-20 ASSESSMENT — SLEEP AND FATIGUE QUESTIONNAIRES
HOW LIKELY ARE YOU TO NOD OFF OR FALL ASLEEP WHILE WATCHING TV: HIGH CHANCE OF DOZING
HOW LIKELY ARE YOU TO NOD OFF OR FALL ASLEEP WHILE SITTING AND TALKING TO SOMEONE: SLIGHT CHANCE OF DOZING
HOW LIKELY ARE YOU TO NOD OFF OR FALL ASLEEP WHILE SITTING QUIETLY AFTER LUNCH WITHOUT ALCOHOL: MODERATE CHANCE OF DOZING
HOW LIKELY ARE YOU TO NOD OFF OR FALL ASLEEP WHILE SITTING AND READING: HIGH CHANCE OF DOZING
HOW LIKELY ARE YOU TO NOD OFF OR FALL ASLEEP WHILE LYING DOWN TO REST IN THE AFTERNOON WHEN CIRCUMSTANCES PERMIT: HIGH CHANCE OF DOZING
HOW LIKELY ARE YOU TO NOD OFF OR FALL ASLEEP WHEN YOU ARE A PASSENGER IN A CAR FOR AN HOUR WITHOUT A BREAK: MODERATE CHANCE OF DOZING
HOW LIKELY ARE YOU TO NOD OFF OR FALL ASLEEP IN A CAR, WHILE STOPPED FOR A FEW MINUTES IN TRAFFIC: WOULD NEVER DOZE
HOW LIKELY ARE YOU TO NOD OFF OR FALL ASLEEP WHILE SITTING INACTIVE IN A PUBLIC PLACE: MODERATE CHANCE OF DOZING

## 2024-06-25 PROBLEM — F33.2 SEVERE EPISODE OF RECURRENT MAJOR DEPRESSIVE DISORDER, WITHOUT PSYCHOTIC FEATURES (H): Chronic | Status: ACTIVE | Noted: 2021-05-05

## 2024-06-25 PROBLEM — F43.12 CHRONIC POST-TRAUMATIC STRESS DISORDER (PTSD): Status: ACTIVE | Noted: 2021-07-15

## 2024-06-25 PROBLEM — F33.1 MODERATE EPISODE OF RECURRENT MAJOR DEPRESSIVE DISORDER (H): Status: RESOLVED | Noted: 2022-09-08 | Resolved: 2024-06-25

## 2024-06-25 PROBLEM — F45.9 PSYCHOSOMATIC DISORDER: Chronic | Status: ACTIVE | Noted: 2023-04-18

## 2024-06-25 PROBLEM — F32.1 MODERATE MAJOR DEPRESSION, SINGLE EPISODE (H): Status: RESOLVED | Noted: 2023-04-18 | Resolved: 2024-06-25

## 2024-06-26 ENCOUNTER — VIRTUAL VISIT (OUTPATIENT)
Dept: SLEEP MEDICINE | Facility: CLINIC | Age: 24
End: 2024-06-26
Payer: COMMERCIAL

## 2024-06-26 VITALS — WEIGHT: 185 LBS | HEIGHT: 61 IN | BODY MASS INDEX: 34.93 KG/M2

## 2024-06-26 DIAGNOSIS — Z72.820 LACK OF ADEQUATE SLEEP: ICD-10-CM

## 2024-06-26 DIAGNOSIS — R06.89 DYSPNEA AND RESPIRATORY ABNORMALITY: Primary | ICD-10-CM

## 2024-06-26 DIAGNOSIS — G47.09 OTHER INSOMNIA: ICD-10-CM

## 2024-06-26 DIAGNOSIS — R53.83 MALAISE AND FATIGUE: ICD-10-CM

## 2024-06-26 DIAGNOSIS — R53.81 MALAISE AND FATIGUE: ICD-10-CM

## 2024-06-26 DIAGNOSIS — R06.83 LOUD SNORING: ICD-10-CM

## 2024-06-26 DIAGNOSIS — R06.00 DYSPNEA AND RESPIRATORY ABNORMALITY: Primary | ICD-10-CM

## 2024-06-26 DIAGNOSIS — G47.30 SLEEP APNEA, UNSPECIFIED TYPE: ICD-10-CM

## 2024-06-26 DIAGNOSIS — E66.09 CLASS 1 OBESITY DUE TO EXCESS CALORIES WITH BODY MASS INDEX (BMI) OF 34.0 TO 34.9 IN ADULT, UNSPECIFIED WHETHER SERIOUS COMORBIDITY PRESENT: ICD-10-CM

## 2024-06-26 DIAGNOSIS — E66.811 CLASS 1 OBESITY DUE TO EXCESS CALORIES WITH BODY MASS INDEX (BMI) OF 34.0 TO 34.9 IN ADULT, UNSPECIFIED WHETHER SERIOUS COMORBIDITY PRESENT: ICD-10-CM

## 2024-06-26 PROBLEM — F43.12 CHRONIC POST-TRAUMATIC STRESS DISORDER (PTSD): Chronic | Status: ACTIVE | Noted: 2021-07-15

## 2024-06-26 PROBLEM — R44.3 HALLUCINATIONS: Chronic | Status: ACTIVE | Noted: 2023-04-18

## 2024-06-26 PROBLEM — F60.9 PERSONALITY DISORDER (H): Chronic | Status: ACTIVE | Noted: 2021-07-15

## 2024-06-26 PROCEDURE — 99244 OFF/OP CNSLTJ NEW/EST MOD 40: CPT | Mod: 95 | Performed by: PHYSICIAN ASSISTANT

## 2024-06-26 ASSESSMENT — PAIN SCALES - GENERAL: PAINLEVEL: NO PAIN (0)

## 2024-06-26 ASSESSMENT — PATIENT HEALTH QUESTIONNAIRE - PHQ9: SUM OF ALL RESPONSES TO PHQ QUESTIONS 1-9: 19
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
## 2024-06-26 NOTE — NURSING NOTE
Is the patient currently in the state of [place]? YES    Visit mode:VIDEO    If the visit is dropped, the patient can be reconnected by: VIDEO VISIT: Text to cell phone:   Telephone Information:   Mobile [phone]   Mobile Not on file.       Will anyone else be joining the visit? NO  (If patient encounters technical issues they should call [phone] :[phone])    How would you like to obtain your AVS? MyChart    Are changes needed to the allergy or medication list? Pt stated no changes to allergies and Pt stated no med changes    Are refills needed on medications prescribed by this physician? YES  Has patient had flu shot for current/most recent flu season? If so, when? No  Depression Response    Patient completed the PHQ-9 assessment for depression and scored >9? Yes-19  Question 9 on the PHQ-9 was positive for suicidality? No  Does patient have current mental health provider? Yes    Is this a virtual visit? Yes   Does patient have suicidal ideation (positive question 9)? No - offer to place Mental Health Referral.  Patient declined referral/not needed-[facility] has a team of mental health providers    I personally notified the following: visit provider           Reason for visit: Consult    Monisha LANGFORD

## 2024-06-26 NOTE — PATIENT INSTRUCTIONS
"          MY TREATMENT INFORMATION FOR SLEEP APNEA-  Mary Chavez Brittani    DOCTOR : Kevin Coleman PA    Am I having a sleep study at a sleep center?  --->Due to normal delays, you will be contacted within 2-4 weeks to schedule    Am I having a home sleep study?  --->Watch the video for the device you are using:    -/drop off device-   https://www.[employer].com/watch?v=yGGFBdELGhk    -Disposable device sent out require phone/computer application-   https://www.Rowl.com/watch?v=BCce_vbiwxE      Frequently asked questions:  1. What is Obstructive Sleep Apnea (DELLA)? DELLA is the most common type of sleep apnea. Apnea means, \"without breath.\"  Apnea is most often caused by narrowing or collapse of the upper airway as muscles relax during sleep.   Almost everyone has occasional apneas. Most people with sleep apnea have had brief interruptions at night frequently for many years.  The severity of sleep apnea is related to how frequent and severe the events are.   2. What are the consequences of DELLA? Symptoms include: feeling sleepy during the day, snoring loudly, gasping or stopping of breathing, trouble sleeping, and occasionally morning headaches or heartburn at night.  Sleepiness can be serious and even increase the risk of falling asleep while driving. Other health consequences may include development of high blood pressure and other cardiovascular disease in persons who are susceptible. Untreated DELLA  can contribute to heart disease, stroke and diabetes.   3. What are the treatment options? In most situations, sleep apnea is a lifelong disease that must be managed with daily therapy. Medications are not effective for sleep apnea and surgery is generally not considered until other therapies have been tried. Your treatment is your choice . Continuous Positive Airway (CPAP) works right away and is the therapy that is effective in nearly everyone. An oral device to hold your jaw forward is usually the next most " reliable option. Other options include postioning devices (to keep you off your back), weight loss, and surgery including a tongue pacing device. There is more detail about some of these options below.  4. Are my sleep studies covered by insurance? Although we will request verification of coverage, we advise you also check in advance of the study to ensure there is coverage.    Important tips for those choosing CPAP and similar devices  REMEMBER-IF YOU RECEIVE A CALL FROM  [phone]-->IT IS TO SETUP A DEVICE  For new devices, sign up for device ADRIEN to monitor your device for your followup visits  We encourage you to utilize the [employer] adrien or website ( https://[employer].Motionbox/ ) to monitor your therapy progress and share the data with your healthcare team when you discuss your sleep apnea.                                                    Know your equipment:  CPAP is continuous positive airway pressure that prevents obstructive sleep apnea by keeping the throat from collapsing while you are sleeping. In most cases, the device is  smart  and can slowly self-adjusts if your throat collapses and keeps a record every day of how well you are treated-this information is available to you and your care team.  BPAP is bilevel positive airway pressure that keeps your throat open and also assists each breath with a pressure boost to maintain adequate breathing.  Special kinds of BPAP are used in patients who have inadequate breathing from lung or heart disease. In most cases, the device is  smart  and can slowly self-adjusts to assist breathing. Like CPAP, the device keeps a record of how well you are treated.  Your mask is your connection to the device. You get to choose what feels most comfortable and the staff will help to make sure if fits. Here: are some examples of the different masks that are available: Magnetic mask aids may assist with use but there are safety issues that should be addressed when  considering with magnets* ( see end of discussion).       Key points to remember on your journey with sleep apnea:  Sleep study.  PAP devices often need to be adjusted during a sleep study to show that they are effective and adjusted right.  Good tips to remember: Try wearing just the mask during a quiet time during the day so your body adapts to wearing it. A humidifier is recommended for comfort in most cases to prevent drying of your nose and throat. Allergy medication from your provider may help you if you are having nasal congestion.  Getting settled-in. It takes more than one night for most of us to get used to wearing a mask. Try wearing just the mask during a quiet time during the day so your body adapts to wearing it. A humidifier is recommended for comfort in most cases. Our team will work with you carefully on the first day and will be in contact within 4 days and again at 2 and 4 weeks for advice and remote device adjustments. Your therapy is evaluated by the device each day.   Use it every night. The more you are able to sleep naturally for 7-8 hours, the more likely you will have good sleep and to prevent health risks or symptoms from sleep apnea. Even if you use it 4 hours it helps. Occasionally all of us are unable to use a medical therapy, in sleep apnea, it is not dangerous to miss one night.   Communicate. Call our skilled team on the number provided on the first day if your visit for problems that make it difficult to wear the device. Over 2 out of 3 patients can learn to wear the device long-term with help from our team. Remember to call our team or your sleep providers if you are unable to wear the device as we may have other solutions for those who cannot adapt to mask CPAP therapy. It is recommended that you sleep your sleep provider within the first 3 months and yearly after that if you are not having problems.   Use it for your health. We encourage use of CPAP masks during daytime quiet  periods to allow your face and brain to adapt to the sensation of CPAP so that it will be a more natural sensation to awaken to at night or during naps. This can be very useful during the first few weeks or months of adapting to CPAP though it does not help medically to wear CPAP during wakefulness and  should not be used as a strategy just to meet guidelines.  Take care of your equipment. Make sure you clean your mask and tubing using directions every day and that your filter and mask are replaced as recommended or if they are not working.     *Masks with magnets:  Updated Contraindications  Masks with magnetic components are contraindicated for use by patients where they, or anyone in close physical contact while using the mask, have the following:   Active medical implants that interact with magnets (i.e., pacemakers, implantable cardioverter defibrillators (ICD), neurostimulators, cerebrospinal fluid (CSF) shunts, insulin/infusion pumps)   Metallic implants/objects containing ferromagnetic material (i.e., aneurysm clips/flow disruption devices, embolic coils, stents, valves, electrodes, implants to restore hearing or balance with implanted magnets, ocular implants, metallic splinters in the eye)  Updated Warning  Keep the mask magnets at a safe distance of at least 6 inches (150 mm) away from implants or medical devices that may be adversely affected by magnetic interference. This warning applies to you or anyone in close physical contact with your mask. The magnets are in the frame and lower headgear clips, with a magnetic field strength of up to 400mT. When worn, they connect to secure the mask but may inadvertently detach while asleep.  Implants/medical devices, including those listed within contraindications, may be adversely affected if they change function under external magnetic fields or contain ferromagnetic materials that attract/repel to magnetic fields (some metallic implants, e.g., contact lenses  with metal, dental implants, metallic cranial plates, screws, jose hole covers, and bone substitute devices). Consult your physician and  of your implant / other medical device for information on the potential adverse effects of magnetic fields.    BESIDES CPAP, WHAT OTHER THERAPIES ARE THERE?    Positioning Device  Positioning devices are generally used when sleep apnea is mild and only occurs on your back.This example shows a pillow that straps around the waist. It may be appropriate for those whose sleep study shows milder sleep apnea that occurs primarily when lying flat on one's back. Preliminary studies have shown benefit but effectiveness at home may need to be verified by a home sleep test. These devices are generally not covered by medical insurance.  Examples of devices that maintain sleeping on the back to prevent snoring and mild sleep apnea.    Belt type body positioner  http://[employer]/    Electronic reminder  http://nightshifttherapy.[employer]iant/            Oral Appliance  What is oral appliance therapy?  An oral appliance device fits on your teeth at night like a retainer used after having braces. The device is made by a specialized dentist and requires several visits over 1-2 months before a manufactured device is made to fit your teeth and is adjusted to prevent your sleep apnea. Once an oral device is working properly, snoring should be improved. A home sleep test may be recommended at that time if to determine whether the sleep apnea is adequately treated.       Some things to remember:  -Oral devices are often, but not always, covered by your medical insurance. Be sure to check with your insurance provider.   -If you are referred for oral therapy, you will be given a list of specialized dentists to consider or you may choose to visit the Web site of the American Academy of Dental Sleep Medicine  -Oral devices are less likely to work if you have severe sleep apnea or are extremely  overweight.     More detailed information  An oral appliance is a small acrylic device that fits over the upper and lower teeth  (similar to a retainer or a mouth guard). This device slightly moves jaw forward, which moves the base of the tongue forward, opens the airway, improves breathing for effective treat snoring and obstructive sleep apnea in perhaps 7 out of 10 people .  The best working devices are custom-made by a dental device  after a mold is made of the teeth 1, 2, 3.  When is an oral appliance indicated?  Oral appliance therapy is recommended as a first-line treatment for patients with primary snoring, mild sleep apnea, and for patients with moderate sleep apnea who prefer appliance therapy to use of CPAP4, 5. Severity of sleep apnea is determined by sleep testing and is based on the number of respiratory events per hour of sleep.   How successful is oral appliance therapy?  The success rate of oral appliance therapy in patients with mild sleep apnea is 75-80% while in patients with moderate sleep apnea it is 50-70%. The chance of success in patients with severe sleep apnea is 40-50%. The research also shows that oral appliances have a beneficial effect on the cardiovascular health of DELLA patients at the same magnitude as CPAP therapy7.  Oral appliances should be a second-line treatment in cases of severe sleep apnea, but if not completely successful then a combination therapy utilizing CPAP plus oral appliance therapy may be effective. Oral appliances tend to be effective in a broad range of patients although studies show that the patients who have the highest success are females, younger patients, those with milder disease, and less severe obesity. 3, 6.   Finding a dentist that practices dental sleep medicine  Specific training is available through the American Academy of Dental Sleep Medicine for dentists interested in working in the field of sleep. To find a dentist who is educated in  the field of sleep and the use of oral appliances, near you, visit the Web site of the American Academy of Dental Sleep Medicine.    References  1. Jaqueline, et al. Objectively measured vs self-reported compliance during oral appliance therapy for sleep-disordered breathing. Chest 2013; 144(5): 4176-4131.  2. Nidia et al. Objective measurement of compliance during oral appliance therapy for sleep-disordered breathing. Thorax 2013; 68(1): 91-96.  3. Rosario et al. Mandibular advancement devices in 620 men and women with DELLA and snoring: tolerability and predictors of treatment success. Chest 2004; 125: 6429-7175.  4. Myles, et al. Oral appliances for snoring and DELLA: a review. Sleep 2006; 29: 244-262.  5. Meme et al. Oral appliance treatment for DELLA: an update. J Clin Sleep Med 2014; 10(2): 215-227.  6. Jonathan et al. Predictors of OSAH treatment outcome. J Dent Res 2007; 86: 7830-6852.      Weight Loss:   Your Body mass index is 34.96 kg/m .    Being overweight does not necessarily mean you will have health consequences.  Those who have BMI over 35 or over 27 with existing medical conditions carries greater risk.   Weight loss decreases severity of sleep apnea in most people with obesity. For those with mild obesity who have developed snoring with weight gain, even 15-30 pound weight loss can improve and occasionally milder eliminate sleep apnea.  Structured and life-long dietary and health habits are necessary to lose weight and keep healthier weight levels.     The Comprehensive Weight loss program offers all aspects of weight loss strategies including two Non-Surgical Weight Loss Programs: Medical Weight Management and our 24 Week Healthy Lifestyle Program:    Medical Weight Management: You will meet with a Medical Weight Management Provider, as well as a Registered Dietician. The program may include medication therapy, dietary education, recommended exercise and physical therapy programs,  monthly support group meetings, and possible psychological counseling. Follow up visits with the provider or dietician are scheduled based on your progress and needs.    24 Week Healthy Lifestyle Program: This unique program is designed to give you the support of weekly appointments and activities thru a 24-week period. It may include all of the components of the basic program (above), with the addition of 11 individual Health  Visits, 24-week access to the [employer] website for over 700 online classes, and monthly support group meetings. This program has an out-of-pocket expense of $499 to cover the items that can not be billed to insurance (health coaches and [employer] access), and is non-refundable/non-transferable (you may be able to use a Health Savings Account; ask your HSA provider). There may be an optional meal replacement plan prescribed as well.   Surgical management achieves meaningful long-term weight loss and improvement in health risks in most patients with more severe obesity.      Sleep Apnea Surgery:    Surgery for obstructive sleep apnea is considered generally only when other therapies fail to work. Surgery may be discussed with you if you are having a difficult time tolerating CPAP and or when there is an abnormal structure that requires surgical correction.  Nose and throat surgeries often enlarge the airway to prevent collapse.  Most of these surgeries create pain for 1-2 weeks and up to half of the most common surgeries are not effective throughout life.  You should carefully discuss the benefits and drawbacks to surgery with your sleep provider and surgeon to determine if it is the best solution for you.   More information  Surgery for DELLA is directed at areas that are responsible for narrowing or complete obstruction of the airway during sleep.  There are a wide range of procedures available to enlarge and/or stabilize the airway to prevent blockage of breathing in the three major  areas where it can occur: the palate, tongue, and nasal regions.  Successful surgical treatment depends on the accurate identification of the factors responsible for obstructive sleep apnea in each person.  A personalized approach is required because there is no single treatment that works well for everyone.  Because of anatomic variation, consultation with an examination by a sleep surgeon is a critical first step in determining what surgical options are best for each patient.  In some cases, examination during sedation may be recommended in order to guide the selection of procedures.  Patients will be counseled about risks and benefits as well as the typical recovery course after surgery. Surgery is typically not a cure for a person s DELLA.  However, surgery will often significantly improve one s DELLA severity (termed  success rate ).  Even in the absence of a cure, surgery will decrease the cardiovascular risk associated with OSA7; improve overall quality of life8 (sleepiness, functionality, sleep quality, etc).      Palate Procedures:  Patients with DELLA often have narrowing of their airway in the region of their tonsils and uvula.  The goals of palate procedures are to widen the airway in this region as well as to help the tissues resist collapse.  Modern palate procedure techniques focus on tissue conservation and soft tissue rearrangement, rather than tissue removal.  Often the uvula is preserved in this procedure. Residual sleep apnea is common in patient after pharyngoplasty with an average reduction in sleep apnea events of 33%2.      Tongue Procedures:  ExamWhile patients are awake, the muscles that surround the throat are active and keep this region open for breathing. These muscles relax during sleep, allowing the tongue and other structures to collapse and block breathing.  There are several different tongue procedures available.  Selection of a tongue base procedure depends on characteristics seen on  physical exam.  Generally, procedures are aimed at removing bulky tissues in this area or preventing the back of the tongue from falling back during sleep.  Success rates for tongue surgery range from 50-62%3.    Hypoglossal Nerve Stimulation:  Hypoglossal nerve stimulation has recently received approval from the [place] Food and Drug Administration for the treatment of obstructive sleep apnea.  This is based on research showing that the system was safe and effective in treating sleep apnea6.  Results showed that the median AHI score decreased 68%, from 29.3 to 9.0. This therapy uses an implant system that senses breathing patterns and delivers mild stimulation to airway muscles, which keeps the airway open during sleep.  The system consists of three fully implanted components: a small generator (similar in size to a pacemaker), a breathing sensor, and a stimulation lead.  Using a small handheld remote, a patient turns the therapy on before bed and off upon awakening.    Candidates for this device must be greater than 18 years of age, have moderate to severe obstructive sleep apnea with less than 25% central events  (AHI between 15-65), BMI less than 35, have tried CPAP/oral appliance for at least 8 weeks without success, and have appropriate upper airway anatomy (determined by a sleep endoscopy performed by Dr. Rickie Gilman or Dr. Dillon Peter).    Nasal Procedures:  Nasal obstruction can interfere with nasal breathing during the day and night.  Studies have shown that relief of nasal obstruction can improve the ability of some patients to tolerate positive airway pressure therapy for obstructive sleep apnea1.  Treatment options include medications such as nasal saline, topical corticosteroid and antihistamine sprays, and oral medications such as antihistamines or decongestants. Non-surgical treatments can include external nasal dilators for selected patients. If these are not successful by themselves,  surgery can improve the nasal airway either alone or in combination with these other options.        Combination Procedures:  Combination of surgical procedures and other treatments may be recommended, particularly if patients have more than one area of narrowing or persistent positional disease.  The success rate of combination surgery ranges from 66-80%2,3.    References  Edouard MAY. The Role of the Nose in Snoring and Obstructive Sleep Apnoea: An Update.  Eur Arch Otorhinolaryngol. 2011; 268: 1365-73.   Rivera SM; Keshawn JA; Vandana JR; Pallanch JF; Joce MB; Joanne SG; Rupal DEWITT. Surgical modifications of the upper airway for obstructive sleep apnea in adults: a systematic review and meta-analysis. SLEEP 2010;33(10):0117-2930. Sugar ROBLES. Hypopharyngeal surgery in obstructive sleep apnea: an evidence-based medicine review.  Arch Otolaryngol Head Neck Surg. 2006 Feb;132(2):206-13.  Zenon YH1, Deb Y, Scott JAGUAR. The efficacy of anatomically based multilevel surgery for obstructive sleep apnea. Otolaryngol Head Neck Surg. 2003 Oct;129(4):327-35.  Sugar ROBLES, Goldberg A. Hypopharyngeal Surgery in Obstructive Sleep Apnea: An Evidence-Based Medicine Review. Arch Otolaryngol Head Neck Surg. 2006 Feb;132(2):206-13.  Willow ZAMBRANO et al. Upper-Airway Stimulation for Obstructive Sleep Apnea.  N Engl J Med. 2014 Jan 9;370(2):139-49.  Reba Y et al. Increased Incidence of Cardiovascular Disease in Middle-aged Men with Obstructive Sleep Apnea. Am J Respir Crit Care Med; 2002 166: 159-165  Ramires EM et al. Studying Life Effects and Effectiveness of Palatopharyngoplasty (SLEEP) study: Subjective Outcomes of Isolated Uvulopalatopharyngoplasty. Otolaryngol Head Neck Surg. 2011; 144: 623-631.        WHAT IF I ONLY HAVE SNORING?    Mandibular advancement devices, lateral sleep positioning, long-term weight loss and treatment of nasal allergies have been shown to improve snoring.  Exercising tongue muscles with a game  (https://[employer].SOLOMO365/us/adrien/soundly-reduce-snoring/bv9855147336) or stimulating the tongue during the day with a device (https://doi.org/10.3390/yyr36948648) have improved snoring in some individuals.  https://www.[employer].SOLOMO365/  https://www.sleepfoundation.org/best-anti-snoring-mouthpieces-and-mouthguards    Remember to Drive Safe... Drive Alive     Sleep health profoundly affects your health, mood, and your safety.  Thirty three percent of the population (one in three of us) is not getting enough sleep and many have a sleep disorder. Not getting enough sleep or having an untreated / undertreated sleep condition may make us sleepy without even knowing it. In fact, our driving could be dramatically impaired due to our sleep health. As your provider, here are some things I would like you to know about driving:     Here are some warning signs for impairment and dangerous drowsy driving:              -Having been awake more than 16 hours               -Looking tired               -Eyelid drooping              -Head nodding (it could be too late at this point)              -Driving for more than 30 minutes     Some things you could do to make the driving safer if you are experiencing some drowsiness:              -Stop driving and rest              -Call for transportation              -Make sure your sleep disorder is adequately treated     Some things that have been shown NOT to work when experiencing drowsiness while driving:              -Turning on the radio              -Opening windows              -Eating any  distracting  /  entertaining  foods (e.g., sunflower seeds, candy, or any other)              -Talking on the phone      Your decision may not only impact your life, but also the life of others. Please, remember to drive safe for yourself and all of us.           Your Body mass index is 34.96 kg/m .  Weight management is a personal decision.  If you are interested in exploring weight loss strategies,  the following discussion covers the approaches that may be successful. Body mass index (BMI) is one way to tell whether you are at a healthy weight, overweight, or obese. It measures your weight in relation to your height.  A BMI of 18.5 to 24.9 is in the healthy range. A person with a BMI of 25 to 29.9 is considered overweight, and someone with a BMI of 30 or greater is considered obese. More than two-thirds of American adults are considered overweight or obese.  Being overweight or obese increases the risk for further weight gain. Excess weight may lead to heart disease and diabetes.  Creating and following plans for healthy eating and physical activity may help you improve your health.  Weight control is part of healthy lifestyle and includes exercise, emotional health, and healthy eating habits. Careful eating habits lifelong are the mainstay of weight control. Though there are significant health benefits from weight loss, long-term weight loss with diet alone may be very difficult to achieve- studies show long-term success with dietary management in less than 10% of people. Attaining a healthy weight may be especially difficult to achieve in those with severe obesity. In some cases, medications, devices and surgical management might be considered.  What can you do?  If you are overweight or obese and are interested in methods for weight loss, you should discuss this with your provider.   Consider reducing daily calorie intake by 500 calories.   Keep a food journal.   Avoiding skipping meals, consider cutting portions instead.    Diet combined with exercise helps maintain muscle while optimizing fat loss. Strength training is particularly important for building and maintaining muscle mass. Exercise helps reduce stress, increase energy, and improves fitness. Increasing exercise without diet control, however, may not burn enough calories to loose weight.     Start walking three days a week 10-20 minutes at a  time  Work towards walking thirty minutes five days a week   Eventually, increase the speed of your walking for 1-2 minutes at time    In addition, we recommend that you review healthy lifestyles and methods for weight loss available through the National Institutes of Health patient information sites:  http://win.niddk.nih.gov/publications/index.htm    And look into health and wellness programs that may be available through your health insurance provider, employer, local community center, or tommy club.

## 2024-07-09 ENCOUNTER — CARE COORDINATION (OUTPATIENT)
Dept: SURGERY | Facility: CLINIC | Age: 24
End: 2024-07-09
Payer: COMMERCIAL

## 2024-07-10 ENCOUNTER — OFFICE VISIT (OUTPATIENT)
Dept: PLASTIC SURGERY | Facility: CLINIC | Age: 24
End: 2024-07-10
Payer: COMMERCIAL

## 2024-07-10 ENCOUNTER — PRE VISIT (OUTPATIENT)
Dept: SURGERY | Facility: CLINIC | Age: 24
End: 2024-07-10

## 2024-07-10 ENCOUNTER — OFFICE VISIT (OUTPATIENT)
Dept: SURGERY | Facility: CLINIC | Age: 24
End: 2024-07-10
Payer: COMMERCIAL

## 2024-07-10 ENCOUNTER — ANESTHESIA EVENT (OUTPATIENT)
Dept: SURGERY | Facility: CLINIC | Age: 24
End: 2024-07-10
Payer: COMMERCIAL

## 2024-07-10 VITALS
BODY MASS INDEX: 35.7 KG/M2 | SYSTOLIC BLOOD PRESSURE: 129 MMHG | DIASTOLIC BLOOD PRESSURE: 87 MMHG | WEIGHT: 194 LBS | HEIGHT: 62 IN | HEART RATE: 74 BPM | OXYGEN SATURATION: 96 %

## 2024-07-10 VITALS
OXYGEN SATURATION: 98 % | HEIGHT: 62 IN | RESPIRATION RATE: 16 BRPM | TEMPERATURE: 97.6 F | HEART RATE: 62 BPM | DIASTOLIC BLOOD PRESSURE: 78 MMHG | WEIGHT: 194 LBS | SYSTOLIC BLOOD PRESSURE: 113 MMHG | BODY MASS INDEX: 35.7 KG/M2

## 2024-07-10 DIAGNOSIS — R55 FAINTING SPELL: ICD-10-CM

## 2024-07-10 DIAGNOSIS — E66.812 CLASS 2 SEVERE OBESITY DUE TO EXCESS CALORIES WITH SERIOUS COMORBIDITY AND BODY MASS INDEX (BMI) OF 35.0 TO 35.9 IN ADULT (H): ICD-10-CM

## 2024-07-10 DIAGNOSIS — E66.01 CLASS 2 SEVERE OBESITY DUE TO EXCESS CALORIES WITH SERIOUS COMORBIDITY AND BODY MASS INDEX (BMI) OF 35.0 TO 35.9 IN ADULT (H): ICD-10-CM

## 2024-07-10 DIAGNOSIS — N62 HYPERTROPHY OF BREAST: Primary | ICD-10-CM

## 2024-07-10 DIAGNOSIS — N62 HYPERTROPHY OF BREAST: ICD-10-CM

## 2024-07-10 DIAGNOSIS — Z01.818 PREOP EXAMINATION: Primary | ICD-10-CM

## 2024-07-10 PROCEDURE — 99203 OFFICE O/P NEW LOW 30 MIN: CPT | Performed by: NURSE PRACTITIONER

## 2024-07-10 PROCEDURE — 99214 OFFICE O/P EST MOD 30 MIN: CPT | Performed by: PLASTIC SURGERY

## 2024-07-10 RX ORDER — CALCIUM CARBONATE 500 MG/1
1 TABLET, CHEWABLE ORAL PRN
COMMUNITY
Start: 2024-07-10

## 2024-07-10 ASSESSMENT — PAIN SCALES - GENERAL
PAINLEVEL: NO PAIN (0)
PAINLEVEL: NO PAIN (0)

## 2024-07-10 NOTE — PROGRESS NOTES
PREOPERATIVE VISIT NOTE     PRESENTING COMPLAINT:  Preop visit for upcoming bilateral breast reduction on 7/26/2024.     HISTORY OF PRESENTING COMPLAINT: The patient is here for a pre-op visit for the patient's surgery.  The patient is being seen in the presence of my nurse. There is no change since the patient's consultation. Please see the consult note for details.     Patient would like to be as small as possible but safely for the nipple.  She wears an H cup wants to be around a B cup.  She understands she has a high risk for a nipple issue and would like no nipple replaced if it necrosis and will get a tattoo.  Schnurr scale is 530 g.  Preop completed today.     ASSESSMENT AND PLAN:  Based upon the above findings, the patient is here for a preop visit for upcoming surgery.  I had a neftali, detailed discussion with the patient in the presence of my nurse (who was present from beginning to end) about the proposed surgery. I was very clear and detailed about overview of surgery, perioperative plans, and expectations. All risks, benefits and alternatives of the surgery including but not limited to pain, infection, bleeding, scarring, asymmetry, seromas, hematomas, wound breakdown, wound dehiscence, prominent scar, hypertrophic scar, keloid scar, requirement of further surgeries, skin/tissue necrosis, nerve/muscle/deeper structure injury, DVT, PE, MI, CVA, pneumonia, renal failure and death, were explained in detail. The patient agreed and understood them all and had all the questions answered to the patient's satisfaction, and the patient wants to proceed with surgery. I look forward to helping the patient out in the near future.  All questions were answered.  The patient was happy with the visit.    Total time spent in the encounter today including chart review, visit itself, and post-visit paperwork was 30 minutes.

## 2024-07-10 NOTE — NURSING NOTE
"Chief Complaint   Patient presents with    Follow Up     Consult, mammoplasty, reduction       Vitals:    07/10/24 0835   BP: 129/87   BP Location: Left arm   Patient Position: Sitting   Cuff Size: Adult Regular   Pulse: 74   SpO2: 96%   Weight: 194 lb   Height: 5' 2\"       Body mass index is 35.48 kg/m .    Mojgan Zuniga, EMT  "

## 2024-07-10 NOTE — NURSING NOTE
Pre Op Teaching Note:       Pre and Post op Patient Education    Relevant Diagnosis:  macromastia  Surgical procedure:  BRM    Patient demonstrates understanding of the following:  Date of surgery:  7/26/24  Location of surgery:  [facility]  History and Physical and any other testing necessary prior to surgery: Yes, discussed with pt need for pre-op within 30 days of surgery with PCP.    Patient demonstrates understanding of the following:    Pre-op showering/scrub information with PCMX Soap: Yes, soap per PAC visit today  Blood thinner medications discussed and when to stop (if applicable):  Per PCP at pre-op.     Post-op follow-up:  Discussed how to contact the hospital, nurse, and clinic scheduling staff if necessary. (See packet information)    Instructional materials used/given/mailed:  [facility] Packet per surgery scheduler, post op teaching sheet, Soap.  Pt advised that final arrival information to come closer to the surgery date by [facility] nurses.

## 2024-07-10 NOTE — H&P
Pre-Operative H & P     CC:  Preoperative exam to assess for increased cardiopulmonary risk while undergoing surgery and anesthesia.    Date of Encounter: 7/10/2024  Primary Care Physician:  Hai Lassiter     Reason for visit:   Encounter Diagnoses   Name Primary?    Preop examination Yes    Hypertrophy of breast     Fainting spell        HPI  Mary Peter is a 24 year old female who presents for pre-operative H & P in preparation for  Procedure Information       Case: [id number] Date/Time: 07/25/24 0730    Procedure: MAMMOPLASTY, REDUCTION, BILATERAL (Bilateral: Breast)    Anesthesia type: General with Block    Diagnosis: Hypertrophy of breast [N62]    Pre-op diagnosis: Hypertrophy of breast [N62]    Location:  [facility] /  OR    Providers: ELIAN Bourne MD            Mary Peter is a 24 year old female with obesity, sleep apnea, obesity, conversion disorder, psychological fainting spells, depression, anxiety, personality disorder, insomnia, ADHD and PTSD that has hypertrophy of the breasts.  She is currently an H cup and the size of her breasts is causing back pain.  She has consulted with Dr. Bourne and the above listed surgery has been planned.     History is obtained from the patient, her mother and chart review    Hx of abnormal bleeding or anti-platelet use: none    Menstrual history: Patient's last menstrual period was 06/23/2024.:      Past Medical History  Past Medical History:   Diagnosis Date    Moderate episode of recurrent major depressive disorder (H) 09/08/2022    Moderate major depression, single episode (H) 04/18/2023       Past Surgical History  Past Surgical History:   Procedure Laterality Date    ADENOIDECTOMY      HC ARTHROSCOPIC REMOVAL GANGLION CYST-WRIST/HAND      x2       Prior to Admission Medications  Current Outpatient Medications   Medication Sig Dispense Refill    calcium carbonate (TUMS) 500 MG chewable tablet Take 1 tablet (500 mg) by mouth as needed       clobetasol (TEMOVATE) 0.05 % external ointment Apply twice daily to areas of rash for 2-4 weeks. Restart if rash flares. Not for the face, underarms, or groin. 30 g 4    FLUoxetine (PROZAC) 20 MG capsule Take 20 mg by mouth every morning      omeprazole (PRILOSEC) 20 MG DR capsule Take 1 capsule (20 mg) by mouth as needed         Allergies  No Known Allergies    Social History  Social History     Socioeconomic History    Marital status: Single     Spouse name: Not on file    Number of children: 0    Years of education: Not on file    Highest education level: Not on file   Occupational History    Occupation: unemployed   Tobacco Use    Smoking status: Never    Smokeless tobacco: Never   Vaping Use    Vaping status: Never Used   Substance and Sexual Activity    Alcohol use: Never    Drug use: Yes     Types: Marijuana     Comment: smoke marijuana daily    Sexual activity: Not on file   Other Topics Concern    Not on file   Social History Narrative    Not on file     Social Determinants of Health     Financial Resource Strain: Low Risk  (3/8/2024)    Financial Resource Strain     Within the past 12 months, have you or your family members you live with been unable to get utilities (heat, electricity) when it was really needed?: No   Food Insecurity: Low Risk  (3/8/2024)    Food Insecurity     Within the past 12 months, did you worry that your food would run out before you got money to buy more?: No     Within the past 12 months, did the food you bought just not last and you didn t have money to get more?: No   Transportation Needs: Low Risk  (3/8/2024)    Transportation Needs     Within the past 12 months, has lack of transportation kept you from medical appointments, getting your medicines, non-medical meetings or appointments, work, or from getting things that you need?: No   Physical Activity: Unknown (3/8/2024)    Exercise Vital Sign     Days of Exercise per Week: 0 days     Minutes of Exercise per Session: Not on  "file   Stress: Stress Concern Present (3/8/2024)    [employer] [place] [employer] - Occupational Stress Questionnaire     Feeling of Stress : Very much   Social Connections: Unknown (3/8/2024)    Social Connection and Isolation Panel [NHANES]     Frequency of Communication with Friends and Family: Not on file     Frequency of Social Gatherings with Friends and Family: More than three times a week     Attends Hindu Services: Not on file     Active Member of Clubs or Organizations: Not on file     Attends Club or Organization Meetings: Not on file     Marital Status: Not on file   Interpersonal Safety: Low Risk  (3/20/2024)    Interpersonal Safety     Do you feel physically and emotionally safe where you currently live?: Yes     Within the past 12 months, have you been hit, slapped, kicked or otherwise physically hurt by someone?: No     Within the past 12 months, have you been humiliated or emotionally abused in other ways by your partner or ex-partner?: No   Housing Stability: Low Risk  (3/8/2024)    Housing Stability     Do you have housing? : Yes     Are you worried about losing your housing?: No       Family History  Family History   Problem Relation Age of Onset    Hypertension Mother     Cerebrovascular Disease Father     Melanoma No family hx of     Skin Cancer No family hx of     Anesthesia Reaction No family hx of     Venous thrombosis No family hx of        Review of Systems  The complete review of systems is negative other than noted in the HPI or here.   Anesthesia Evaluation   Pt has had prior anesthetic.     History of anesthetic complications  - .  post-op hypertension x 1.    ROS/MED HX  ENT/Pulmonary:     (+) sleep apnea, doesn't use CPAP,   DELLA risk factors, snores loudly,  obese,  observed stopped breathing,                           (-) recent URI   Neurologic: Comment: \"Psychological fainting secondary to trauma\"    (+)      migraines,                       (-) no seizures " "  Cardiovascular:     (+)  - -   -  - -             fainting (syncope) (daily fainting spells, \"multiple times per day.\").                    Previous cardiac testing   Echo: Date: Results:    Stress Test:  Date: Results:    ECG Reviewed:  Date: 4/18/23 Results:  Sinus rhythm  Cath:  Date: Results:      METS/Exercise Tolerance: >4 METS Comment: Hiking, walking and going to the gym occasionally.  Denies exertional dyspnea or angina.    Hematologic:  - neg hematologic  ROS     Musculoskeletal: Comment: Chronic back pain      GI/Hepatic:     (+) GERD, Asymptomatic on medication,                  Renal/Genitourinary:  - neg Renal ROS     Endo:     (+)               Obesity,       Psychiatric/Substance Use:     (+) psychiatric history anxiety, depression and other (comment) (insomnia, ADHD, PTSD, personality disorder)   Recreational drug usage: Cannabis.    Infectious Disease:  - neg infectious disease ROS     Malignancy:  - neg malignancy ROS     Other:  - neg other ROS   (-) Any chance pregnant       /78 (BP Location: Right arm, Patient Position: Sitting, Cuff Size: Adult Large)   Pulse 62   Temp 97.6  F (36.4  C) (Oral)   Resp 16   Ht 1.575 m (5' 2\")   Wt 88 kg (194 lb)   LMP 06/23/2024   SpO2 98%   BMI 35.48 kg/m      Physical Exam   Constitutional: Awake, alert, cooperative, no apparent distress, and appears stated age. obese  Eyes: Pupils equal, round and reactive to light, extra ocular muscles intact, sclera clear, conjunctiva normal.  HENT: Normocephalic, oral pharynx with moist mucus membranes, good dentition. No goiter appreciated.   Respiratory: Clear to auscultation bilaterally, no crackles or wheezing.  Cardiovascular: Regular rate and rhythm, normal S1 and S2, and no murmur noted.  Carotids +2, no bruits. No edema. Palpable pulses to radial  DP and PT arteries.   GI: Normal bowel sounds, soft, non-distended, non-tender, no masses palpated, no hepatosplenomegaly.   Lymph/Hematologic: No " cervical lymphadenopathy and no supraclavicular lymphadenopathy.  Genitourinary:  deferred  Skin: Warm and dry.  No rashes at anticipated surgical site.   Musculoskeletal: Full ROM of neck. There is no redness, warmth, or swelling of the exposed joints. Gross motor strength is normal.    Neurologic: Awake, alert, oriented to name, place and time. Cranial nerves II-XII are grossly intact. Gait is normal.   Neuropsychiatric: Calm, cooperative. Normal affect.     Prior Labs/Diagnostic Studies   All labs and imaging personally reviewed     EKG/ stress test - if available please see in ROS above       Component      Latest Ref Rng 3/8/2024  6:41 PM   Sodium      135 - 145 mmol/L 141    Potassium      3.4 - 5.3 mmol/L 4.4    Carbon Dioxide (CO2)      22 - 29 mmol/L 25    Anion Gap      7 - 15 mmol/L 10    Urea Nitrogen      6.0 - 20.0 mg/dL 9.4    Creatinine      0.51 - 0.95 mg/dL 0.77    GFR Estimate      >60 mL/min/1.73m2 >90    Calcium      8.6 - 10.0 mg/dL 9.5    Chloride      98 - 107 mmol/L 106    Glucose      70 - 99 mg/dL 67 (L)    Alkaline Phosphatase      40 - 150 U/L 86    AST      0 - 45 U/L 26    ALT      0 - 50 U/L 31    Protein Total      6.4 - 8.3 g/dL 7.9    Albumin      3.5 - 5.2 g/dL 4.4    Bilirubin Total      <=1.2 mg/dL 0.2    WBC      4.0 - 11.0 10e3/uL 9.0    RBC Count      3.80 - 5.20 10e6/uL 4.55    Hemoglobin      11.7 - 15.7 g/dL 12.7    Hematocrit      35.0 - 47.0 % 38.4    MCV      78 - 100 fL 84    MCH      26.5 - 33.0 pg 27.9    MCHC      31.5 - 36.5 g/dL 33.1    RDW      10.0 - 15.0 % 14.0    Platelet Count      150 - 450 10e3/uL 412       Legend:  (L) Low        The patient's records and results personally reviewed by this provider.     Outside records reviewed from: Care Everywhere    LAB/DIAGNOSTIC STUDIES TODAY:  none    Assessment    Mary Peter is a 24 year old female seen as a PAC referral for risk assessment and optimization for anesthesia.    Plan/Recommendations  Pt will be  "optimized for the proposed procedure.  See below for details on the assessment, risk, and preoperative recommendations    NEUROLOGY  - No history of TIA, CVA or seizure  - - see note in psych section below regarding syncope history    -Post Op delirium risk factors:  No risk identified    ENT  - No current airway concerns.  Will need to be reassessed day of surgery.  Mallampati: I  TM: > 3    CARDIAC  - No history of CAD, Hypertension, and Afib  - see note in psych section below regarding syncope history    - METS (Metabolic Equivalents)  Patient performs 4 or more METS exercise without symptoms             Total Score: 0      RCRI-Very low risk: Class 1 0.4% complication rate             Total Score: 0        PULMONARY  - Obstructive Sleep Apnea  DELLA without home CPAP use.    - Denies asthma or inhaler use  - Tobacco History    History   Smoking Status    Never   Smokeless Tobacco    Never       GI  - GERD is well controlled with omeprazole.    PONV High Risk  Total Score: 3           1 AN PONV: Pt is Female    1 AN PONV: Patient is not a current smoker    1 AN PONV: Intended Post Op Opioids          ENDOCRINE    - BMI: Estimated body mass index is 35.48 kg/m  as calculated from the following:    Height as of this encounter: 1.575 m (5' 2\").    Weight as of this encounter: 88 kg (194 lb).  Obesity (BMI >30)      HEME  VTE Low Risk 0.26%             Total Score: 0      - No history of abnormal bleeding or antiplatelet use.      MSK  - chronic back pain.  Consider cautious positioning.     PSYCH  - continue prozac without interruption.    - patient with well documented conversion disorder with \"convulsions\" and psychological fainting spells secondary to prior trauma.  She has been fully worked up at outside facilities including EEG to rule out seizures.  She and her mother note that echocardiogram done at [facility] approximately 4-5 years ago (report not currently available,but will try to obtain) was negative for " any cardiac abnormality.  She notes that she faints multiple times per day and the only known cause is her anxiety and PTSD.    Due to this history, we have recommended that her case be changed from [facility] to [place].  The patient and her mother are disappointed with this recommendation as they are worried that it will push the surgery out much farther, but our rationale has been fully discussed and Dr. Dunn did confirm with the  anesthesia AIC that they would request the same.      The surgical team has been notified of the location change recommendation.         Different anesthesia methods/types have been discussed with the patient, but they are aware that the final plan will be decided by the assigned anesthesia provider on the date of service.  Patient was discussed with Dr Dunn    The patient is optimized for their procedure. AVS with information on surgery time/arrival time, meds and NPO status given by nursing staff. No further diagnostic testing indicated.      On the day of service:     Prep time: 10 minutes  Visit time: 21 minutes  Documentation/coordination time: 12 minutes  ------------------------------------------  Total time: 43 minutes      NANDO Mendoza CNP  Preoperative Assessment Center  [facility]  Clinic and Surgery Center  Phone: [phone]  Fax: [phone]

## 2024-07-10 NOTE — LETTER
7/10/2024       RE: Mary Peter  [address]  [place] [address]     Dear Colleague,    Thank you for referring your patient, Mary Peter, to the [facility]ON[facility]GERY CLINIC [place] at [facility]. Please see a copy of my visit note below.    PREOPERATIVE VISIT NOTE     PRESENTING COMPLAINT:  Preop visit for upcoming bilateral breast reduction on 7/26/2024.     HISTORY OF PRESENTING COMPLAINT: The patient is here for a pre-op visit for the patient's surgery.  The patient is being seen in the presence of my nurse. There is no change since the patient's consultation. Please see the consult note for details.     Patient would like to be as small as possible but safely for the nipple.  She wears an H cup wants to be around a B cup.  She understands she has a high risk for a nipple issue and would like no nipple replaced if it necrosis and will get a tattoo.  Schnurr scale is 530 g.  Preop completed today.     ASSESSMENT AND PLAN:  Based upon the above findings, the patient is here for a preop visit for upcoming surgery.  I had a neftali, detailed discussion with the patient in the presence of my nurse (who was present from beginning to end) about the proposed surgery. I was very clear and detailed about overview of surgery, perioperative plans, and expectations. All risks, benefits and alternatives of the surgery including but not limited to pain, infection, bleeding, scarring, asymmetry, seromas, hematomas, wound breakdown, wound dehiscence, prominent scar, hypertrophic scar, keloid scar, requirement of further surgeries, skin/tissue necrosis, nerve/muscle/deeper structure injury, DVT, PE, MI, CVA, pneumonia, renal failure and death, were explained in detail. The patient agreed and understood them all and had all the questions answered to the patient's satisfaction, and the patient wants to proceed with surgery. I  look forward to helping the patient out in the near future.  All questions were answered.  The patient was happy with the visit.    Total time spent in the encounter today including chart review, visit itself, and post-visit paperwork was 30 minutes.       Again, thank you for allowing me to participate in the care of your patient.      Sincerely,    ELIAN Bourne MD

## 2024-07-16 ENCOUNTER — TELEPHONE (OUTPATIENT)
Dept: PLASTIC SURGERY | Facility: CLINIC | Age: 24
End: 2024-07-16
Payer: COMMERCIAL

## 2024-07-16 NOTE — TELEPHONE ENCOUNTER
----- Message from Haley New sent at 7/10/2024 10:24 AM CDT -----  Regarding: change surgical location  Hi Nancy    This patient was just seen in PAC.  Her case needs to be moved from [place] to [place] due to her frequent (multiple times per day) psychological fainting spells.    Can you please contact the patient or her mother Misty to arrange?  They are aware of the need to change location.        Thank you!  Haley New DNP, RN, APRN

## 2024-07-16 NOTE — TELEPHONE ENCOUNTER
Spoke with Zion regarding rescheduling surgery with Dr. 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

## 2024-07-17 ASSESSMENT — ENCOUNTER SYMPTOMS: SEIZURES: 0

## 2024-07-24 NOTE — OR NURSING
"Surgery tomorrow. Pt faints multiple times per day (Conversion disorder). Pt unable to remove 2 piercings  Received: Today  Melia New, ELIAN Solorio MD; Stewart Dunn MD; Lia Woodruff RN; Kyra Arguello, ADILIA; Ewa New, RN  Hello,    Pt is scheduled to arrive at 0600 tomorrow for MAMMOPLASTY, REDUCTION, BILATERAL.    Per PAC H&P on 7/10, pt has well documented conversion disorder with \"convulsions\" and psychological fainting spells secondary to prior trauma.  She has been fully worked up at outside facilities including EEG to rule out seizures.  She and her mother note that echocardiogram done at [facility] approximately 4-5 years ago (report not currently available,but will try to obtain) was negative for any cardiac abnormality.  She notes that she faints multiple times per day and the only known cause is her anxiety and PTSD.    Pre-op call done with pt and her mom. Pt is a falls risk. She said she is not aware that she is falling when she faints and in unable to stop herself from falling.    I informed pt of the requirements for removing piercings. She estimates that she has 17-18 piercings. She said she is physically unable to remove the piercing of left Tragus and Left Helix, unless she goes to a shop to have them removed. For the other piercings, she can remove them but wants to know if she can put a plastic retainer in them.    Please advise.    Kind regards,    Melia New RN, BSN  Pre-Anesthesia Screening   Office  [phone] Direct Line  " 70

## 2024-07-24 NOTE — OR NURSING
"RE: Surgery tomorrow. Pt faints multiple times per day (Conversion disorder). Pt unable to remove 2 piercings  Received: Today  Ewa New, Melia Weinstein RN; ELIAN Bourne MD; Stewart Dunn MD; Lia Woodruff RN; Kyra Arguello, ADILIA  Hi Melia,    We are OK with plastic spacers to hold piercings open, if there are piercings that cannot or will not be removed the patient can sign a waiver accepting the risk, correct?    Does this answer your question?    The history of convulsions is known to us, for this reason her surgery was moved to the [facility] per Anesthesia's request.    Ewa JOSEPH RN          Previous Messages       ----- Message -----  From: Melia New RN  Sent: 7/24/2024  11:48 AM CDT  To: Stewart Dunn MD; Lia GRIFFIN RN; *  Subject: Surgery tomorrow. Pt faints multiple times p*    Hello,    Pt is scheduled to arrive at 0600 tomorrow for MAMMOPLASTY, REDUCTION, BILATERAL.    Per PAC H&P on 7/10, pt has well documented conversion disorder with \"convulsions\" and psychological fainting spells secondary to prior trauma.  She has been fully worked up at outside facilities including EEG to rule out seizures.  She and her mother note that echocardiogram done at [facility] approximately 4-5 years ago (report not currently available,but will try to obtain) was negative for any cardiac abnormality.  She notes that she faints multiple times per day and the only known cause is her anxiety and PTSD.    Pre-op call done with pt and her mom. Pt is a falls risk. She said she is not aware that she is falling when she faints and in unable to stop herself from falling.    I informed pt of the requirements for removing piercings. She estimates that she has 17-18 piercings. She said she is physically unable to remove the piercing of left Tragus and Left Helix, unless she goes to a shop to have them removed. For the other piercings, she can remove them but wants to know if she can put a plastic " retainer in them.    Please advise.    Kind regards,    Melia New RN, BSN  Pre-Anesthesia Screening   Office   Direct Line

## 2024-07-24 NOTE — ANESTHESIA PREPROCEDURE EVALUATION
Anesthesia Pre-Procedure Evaluation    Patient: Mary Peter   MRN: [id number] : 2000        Procedure : Procedure(s):  MAMMOPLASTY, REDUCTION, BILATERAL          Past Medical History:   Diagnosis Date    Moderate episode of recurrent major depressive disorder (H) 2022    Moderate major depression, single episode (H) 2023      Past Surgical History:   Procedure Laterality Date    ADENOIDECTOMY      HC ARTHROSCOPIC REMOVAL GANGLION CYST-WRIST/HAND      x2      No Known Allergies   Social History     Tobacco Use    Smoking status: Never    Smokeless tobacco: Never   Substance Use Topics    Alcohol use: Not Currently      Wt Readings from Last 1 Encounters:   07/10/24 88 kg (194 lb)        Anesthesia Evaluation   Pt has had prior anesthetic. Type: General.    No history of anesthetic complications       ROS/MED HX  ENT/Pulmonary:     (+) sleep apnea (sleep study to be completed in 2024), mild, doesn't use CPAP,                                      Neurologic:  - neg neurologic ROS  (-) no seizures and no CVA   Cardiovascular:  - neg cardiovascular ROS  (-) REY, syncope, arrhythmias and irregular heartbeat/palpitations   METS/Exercise Tolerance: >4 METS    Hematologic:  - neg hematologic  ROS     Musculoskeletal:  - neg musculoskeletal ROS     GI/Hepatic:     (+) GERD, Asymptomatic on medication,                  Renal/Genitourinary:  - neg Renal ROS     Endo:     (+)               Obesity,       Psychiatric/Substance Use:     (+) psychiatric history anxiety and depression (ADHD, PTSD)       Infectious Disease:       Malignancy:       Other:            Physical Exam    Airway        Mallampati: II   TM distance: > 3 FB   Neck ROM: full   Mouth opening: > 3 cm    Respiratory Devices and Support         Dental       (+) Completely normal teeth      Cardiovascular   cardiovascular exam normal       Rhythm and rate: regular and normal     Pulmonary   pulmonary exam normal        breath sounds  "clear to auscultation           OUTSIDE LABS:  CBC:   Lab Results   Component Value Date    WBC 9.0 03/08/2024    WBC 7.5 04/18/2023    HGB 12.7 03/08/2024    HGB 14.0 04/18/2023    HCT 38.4 03/08/2024    HCT 43.0 04/18/2023     03/08/2024     (H) 04/18/2023     BMP:   Lab Results   Component Value Date     03/08/2024     04/18/2023    POTASSIUM 4.4 03/08/2024    POTASSIUM 4.2 04/18/2023    CHLORIDE 106 03/08/2024    CHLORIDE 105 04/18/2023    CO2 25 03/08/2024    CO2 27 04/18/2023    BUN 9.4 03/08/2024    BUN 10.8 04/18/2023    CR 0.77 03/08/2024    CR 0.77 04/18/2023    GLC 67 (L) 03/08/2024     (H) 04/18/2023     COAGS: No results found for: \"PTT\", \"INR\", \"FIBR\"  POC:   Lab Results   Component Value Date    HCG Negative 05/02/2021    HCGS Negative 04/18/2023     HEPATIC:   Lab Results   Component Value Date    ALBUMIN 4.4 03/08/2024    PROTTOTAL 7.9 03/08/2024    ALT 31 03/08/2024    AST 26 03/08/2024    ALKPHOS 86 03/08/2024    BILITOTAL 0.2 03/08/2024     OTHER:   Lab Results   Component Value Date    A1C 5.0 03/08/2024    ELEANOR 9.5 03/08/2024    LIPASE 69 05/02/2021    TSH 2.34 03/08/2024       Anesthesia Plan    ASA Status:  2    NPO Status:  NPO Appropriate    Anesthesia Type: General.     - Airway: ETT   Induction: Intravenous, Propofol.   Maintenance: Balanced.   Techniques and Equipment:     - Lines/Monitors: BIS     Consents    Anesthesia Plan(s) and associated risks, benefits, and realistic alternatives discussed. Questions answered and patient/representative(s) expressed understanding.     - Discussed: Risks, Benefits and Alternatives for BOTH SEDATION and the PROCEDURE were discussed     - Discussed with:  Patient, Parent (Mother and/or Father)      - Extended Intubation/Ventilatory Support Discussed: No.      - Patient is DNR/DNI Status: No     Use of blood products discussed: No .     Postoperative Care    Pain management: IV analgesics, Oral pain medications.   PONV " "prophylaxis: Ondansetron (or other 5HT-3), Dexamethasone or Solumedrol, Background Propofol Infusion, Aprepitant     Comments:    Other Comments: 23 yo female PMHx DELLA, ADHD, anxiety, PTSD presenting for reduction mammoplasty. Plan GETA with standard ASA monitors + BIS. Risks and benefits of anesthesia/procedure explained including but not limited to allergic reaction, aspiration, somnolence, delirium, vocal cord/dental trauma, nausea/vomiting, arrhythmia, stroke, bleeding, MI.              Flory France MD    I have reviewed the pertinent notes and labs in the chart from the past 30 days and (re)examined the patient.  Any updates or changes from those notes are reflected in this note.              # Obesity: Estimated body mass index is 35.48 kg/m  as calculated from the following:    Height as of 7/10/24: 1.575 m (5' 2\").    Weight as of 7/10/24: 88 kg (194 lb).      "

## 2024-07-25 ENCOUNTER — ANESTHESIA (OUTPATIENT)
Dept: SURGERY | Facility: CLINIC | Age: 24
End: 2024-07-25
Payer: COMMERCIAL

## 2024-07-25 ENCOUNTER — HOSPITAL ENCOUNTER (OUTPATIENT)
Facility: CLINIC | Age: 24
Discharge: HOME | End: 2024-07-25
Attending: PLASTIC SURGERY | Admitting: PLASTIC SURGERY
Payer: COMMERCIAL

## 2024-07-25 VITALS
DIASTOLIC BLOOD PRESSURE: 71 MMHG | BODY MASS INDEX: 35.13 KG/M2 | TEMPERATURE: 97.8 F | OXYGEN SATURATION: 97 % | WEIGHT: 190.92 LBS | RESPIRATION RATE: 16 BRPM | SYSTOLIC BLOOD PRESSURE: 107 MMHG | HEIGHT: 62 IN | HEART RATE: 71 BPM

## 2024-07-25 DIAGNOSIS — N62 HYPERTROPHY OF BREAST: Primary | ICD-10-CM

## 2024-07-25 PROCEDURE — 710N000010 HC RECOVERY PHASE 1, LEVEL 2, PER MIN: Performed by: PLASTIC SURGERY

## 2024-07-25 PROCEDURE — 250N000011 HC RX IP 250 OP 636

## 2024-07-25 PROCEDURE — 258N000003 HC RX IP 258 OP 636: Performed by: STUDENT IN AN ORGANIZED HEALTH CARE EDUCATION/TRAINING PROGRAM

## 2024-07-25 PROCEDURE — 250N000011 HC RX IP 250 OP 636: Performed by: STUDENT IN AN ORGANIZED HEALTH CARE EDUCATION/TRAINING PROGRAM

## 2024-07-25 PROCEDURE — 370N000017 HC ANESTHESIA TECHNICAL FEE, PER MIN: Performed by: PLASTIC SURGERY

## 2024-07-25 PROCEDURE — 250N000025 HC SEVOFLURANE, PER MIN: Performed by: PLASTIC SURGERY

## 2024-07-25 PROCEDURE — 272N000001 HC OR GENERAL SUPPLY STERILE: Performed by: PLASTIC SURGERY

## 2024-07-25 PROCEDURE — 88305 TISSUE EXAM BY PATHOLOGIST: CPT | Mod: 26 | Performed by: PATHOLOGY

## 2024-07-25 PROCEDURE — 710N000012 HC RECOVERY PHASE 2, PER MINUTE: Performed by: PLASTIC SURGERY

## 2024-07-25 PROCEDURE — 250N000009 HC RX 250: Performed by: STUDENT IN AN ORGANIZED HEALTH CARE EDUCATION/TRAINING PROGRAM

## 2024-07-25 PROCEDURE — 88305 TISSUE EXAM BY PATHOLOGIST: CPT | Mod: TC | Performed by: PLASTIC SURGERY

## 2024-07-25 PROCEDURE — 19318 BREAST REDUCTION: CPT | Performed by: NURSE ANESTHETIST, CERTIFIED REGISTERED

## 2024-07-25 PROCEDURE — 999N000141 HC STATISTIC PRE-PROCEDURE NURSING ASSESSMENT: Performed by: PLASTIC SURGERY

## 2024-07-25 PROCEDURE — 250N000011 HC RX IP 250 OP 636: Performed by: PLASTIC SURGERY

## 2024-07-25 PROCEDURE — 19318 BREAST REDUCTION: CPT | Mod: 50 | Performed by: PLASTIC SURGERY

## 2024-07-25 PROCEDURE — 19318 BREAST REDUCTION: CPT | Performed by: STUDENT IN AN ORGANIZED HEALTH CARE EDUCATION/TRAINING PROGRAM

## 2024-07-25 PROCEDURE — 250N000013 HC RX MED GY IP 250 OP 250 PS 637: Performed by: STUDENT IN AN ORGANIZED HEALTH CARE EDUCATION/TRAINING PROGRAM

## 2024-07-25 PROCEDURE — 64450 NJX AA&/STRD OTHER PN/BRANCH: CPT | Mod: 59 | Performed by: ANESTHESIOLOGY

## 2024-07-25 PROCEDURE — 360N000076 HC SURGERY LEVEL 3, PER MIN: Performed by: PLASTIC SURGERY

## 2024-07-25 RX ORDER — NALOXONE HYDROCHLORIDE 0.4 MG/ML
0.4 INJECTION, SOLUTION INTRAMUSCULAR; INTRAVENOUS; SUBCUTANEOUS
Status: DISCONTINUED | OUTPATIENT
Start: 2024-07-25 | End: 2024-07-25 | Stop reason: HOSPADM

## 2024-07-25 RX ORDER — EPHEDRINE SULFATE 50 MG/ML
INJECTION, SOLUTION INTRAMUSCULAR; INTRAVENOUS; SUBCUTANEOUS PRN
Status: DISCONTINUED | OUTPATIENT
Start: 2024-07-25 | End: 2024-07-25

## 2024-07-25 RX ORDER — LIDOCAINE HYDROCHLORIDE 20 MG/ML
INJECTION, SOLUTION INFILTRATION; PERINEURAL PRN
Status: DISCONTINUED | OUTPATIENT
Start: 2024-07-25 | End: 2024-07-25

## 2024-07-25 RX ORDER — HYDROMORPHONE HCL IN WATER/PF 6 MG/30 ML
0.4 PATIENT CONTROLLED ANALGESIA SYRINGE INTRAVENOUS EVERY 5 MIN PRN
Status: DISCONTINUED | OUTPATIENT
Start: 2024-07-25 | End: 2024-07-25 | Stop reason: HOSPADM

## 2024-07-25 RX ORDER — DEXMEDETOMIDINE HYDROCHLORIDE 4 UG/ML
INJECTION, SOLUTION INTRAVENOUS
Status: COMPLETED | OUTPATIENT
Start: 2024-07-25 | End: 2024-07-25

## 2024-07-25 RX ORDER — ONDANSETRON 2 MG/ML
INJECTION INTRAMUSCULAR; INTRAVENOUS PRN
Status: DISCONTINUED | OUTPATIENT
Start: 2024-07-25 | End: 2024-07-25

## 2024-07-25 RX ORDER — NALOXONE HYDROCHLORIDE 0.4 MG/ML
0.2 INJECTION, SOLUTION INTRAMUSCULAR; INTRAVENOUS; SUBCUTANEOUS
Status: DISCONTINUED | OUTPATIENT
Start: 2024-07-25 | End: 2024-07-25 | Stop reason: HOSPADM

## 2024-07-25 RX ORDER — NALOXONE HYDROCHLORIDE 0.4 MG/ML
0.1 INJECTION, SOLUTION INTRAMUSCULAR; INTRAVENOUS; SUBCUTANEOUS
Status: DISCONTINUED | OUTPATIENT
Start: 2024-07-25 | End: 2024-07-25 | Stop reason: HOSPADM

## 2024-07-25 RX ORDER — OXYCODONE HYDROCHLORIDE 5 MG/1
5-10 TABLET ORAL EVERY 6 HOURS PRN
Qty: 20 TABLET | Refills: 0 | Status: SHIPPED | OUTPATIENT
Start: 2024-07-25 | End: 2024-07-31

## 2024-07-25 RX ORDER — OXYCODONE HYDROCHLORIDE 5 MG/1
5 TABLET ORAL
Status: COMPLETED | OUTPATIENT
Start: 2024-07-25 | End: 2024-07-25

## 2024-07-25 RX ORDER — NALBUPHINE HYDROCHLORIDE 10 MG/ML
5 INJECTION, SOLUTION INTRAMUSCULAR; INTRAVENOUS; SUBCUTANEOUS ONCE
Status: COMPLETED | OUTPATIENT
Start: 2024-07-25 | End: 2024-07-25

## 2024-07-25 RX ORDER — FLUMAZENIL 0.1 MG/ML
0.2 INJECTION, SOLUTION INTRAVENOUS
Status: DISCONTINUED | OUTPATIENT
Start: 2024-07-25 | End: 2024-07-25 | Stop reason: HOSPADM

## 2024-07-25 RX ORDER — ONDANSETRON 4 MG/1
4 TABLET, ORALLY DISINTEGRATING ORAL EVERY 8 HOURS PRN
Qty: 4 TABLET | Refills: 0 | Status: SHIPPED | OUTPATIENT
Start: 2024-07-25 | End: 2024-07-25

## 2024-07-25 RX ORDER — FENTANYL CITRATE 50 UG/ML
50 INJECTION, SOLUTION INTRAMUSCULAR; INTRAVENOUS EVERY 5 MIN PRN
Status: DISCONTINUED | OUTPATIENT
Start: 2024-07-25 | End: 2024-07-25 | Stop reason: HOSPADM

## 2024-07-25 RX ORDER — ONDANSETRON 4 MG/1
4 TABLET, ORALLY DISINTEGRATING ORAL EVERY 8 HOURS PRN
Qty: 4 TABLET | Refills: 0 | Status: SHIPPED | OUTPATIENT
Start: 2024-07-25

## 2024-07-25 RX ORDER — ACETAMINOPHEN 325 MG/1
975 TABLET ORAL ONCE
Status: COMPLETED | OUTPATIENT
Start: 2024-07-25 | End: 2024-07-25

## 2024-07-25 RX ORDER — ONDANSETRON 2 MG/ML
4 INJECTION INTRAMUSCULAR; INTRAVENOUS EVERY 30 MIN PRN
Status: DISCONTINUED | OUTPATIENT
Start: 2024-07-25 | End: 2024-07-25 | Stop reason: HOSPADM

## 2024-07-25 RX ORDER — BUPIVACAINE HYDROCHLORIDE 2.5 MG/ML
INJECTION, SOLUTION EPIDURAL; INFILTRATION; INTRACAUDAL
Status: COMPLETED | OUTPATIENT
Start: 2024-07-25 | End: 2024-07-25

## 2024-07-25 RX ORDER — BUPIVACAINE HYDROCHLORIDE AND EPINEPHRINE 2.5; 5 MG/ML; UG/ML
INJECTION, SOLUTION INFILTRATION; PERINEURAL
Status: COMPLETED | OUTPATIENT
Start: 2024-07-25 | End: 2024-07-25

## 2024-07-25 RX ORDER — HYDROMORPHONE HCL IN WATER/PF 6 MG/30 ML
0.2 PATIENT CONTROLLED ANALGESIA SYRINGE INTRAVENOUS EVERY 5 MIN PRN
Status: DISCONTINUED | OUTPATIENT
Start: 2024-07-25 | End: 2024-07-25 | Stop reason: HOSPADM

## 2024-07-25 RX ORDER — AMOXICILLIN 250 MG
1-2 CAPSULE ORAL 2 TIMES DAILY
Qty: 30 TABLET | Refills: 0 | Status: SHIPPED | OUTPATIENT
Start: 2024-07-25 | End: 2024-07-25

## 2024-07-25 RX ORDER — SODIUM CHLORIDE, SODIUM LACTATE, POTASSIUM CHLORIDE, CALCIUM CHLORIDE 600; 310; 30; 20 MG/100ML; MG/100ML; MG/100ML; MG/100ML
INJECTION, SOLUTION INTRAVENOUS CONTINUOUS PRN
Status: DISCONTINUED | OUTPATIENT
Start: 2024-07-25 | End: 2024-07-25

## 2024-07-25 RX ORDER — PROPOFOL 10 MG/ML
INJECTION, EMULSION INTRAVENOUS CONTINUOUS PRN
Status: DISCONTINUED | OUTPATIENT
Start: 2024-07-25 | End: 2024-07-25

## 2024-07-25 RX ORDER — FENTANYL CITRATE 50 UG/ML
25-50 INJECTION, SOLUTION INTRAMUSCULAR; INTRAVENOUS
Status: DISCONTINUED | OUTPATIENT
Start: 2024-07-25 | End: 2024-07-25 | Stop reason: HOSPADM

## 2024-07-25 RX ORDER — NALBUPHINE HYDROCHLORIDE 20 MG/ML
5 INJECTION, SOLUTION INTRAMUSCULAR; INTRAVENOUS; SUBCUTANEOUS ONCE
Status: DISCONTINUED | OUTPATIENT
Start: 2024-07-25 | End: 2024-07-25

## 2024-07-25 RX ORDER — DEXAMETHASONE SODIUM PHOSPHATE 10 MG/ML
INJECTION, SOLUTION INTRAMUSCULAR; INTRAVENOUS
Status: COMPLETED | OUTPATIENT
Start: 2024-07-25 | End: 2024-07-25

## 2024-07-25 RX ORDER — SODIUM CHLORIDE, SODIUM LACTATE, POTASSIUM CHLORIDE, CALCIUM CHLORIDE 600; 310; 30; 20 MG/100ML; MG/100ML; MG/100ML; MG/100ML
INJECTION, SOLUTION INTRAVENOUS CONTINUOUS
Status: DISCONTINUED | OUTPATIENT
Start: 2024-07-25 | End: 2024-07-25 | Stop reason: HOSPADM

## 2024-07-25 RX ORDER — CEFAZOLIN SODIUM/WATER 2 G/20 ML
2 SYRINGE (ML) INTRAVENOUS SEE ADMIN INSTRUCTIONS
Status: DISCONTINUED | OUTPATIENT
Start: 2024-07-25 | End: 2024-07-25 | Stop reason: HOSPADM

## 2024-07-25 RX ORDER — ONDANSETRON 4 MG/1
4 TABLET, ORALLY DISINTEGRATING ORAL EVERY 30 MIN PRN
Status: DISCONTINUED | OUTPATIENT
Start: 2024-07-25 | End: 2024-07-25 | Stop reason: HOSPADM

## 2024-07-25 RX ORDER — DEXAMETHASONE SODIUM PHOSPHATE 4 MG/ML
INJECTION, SOLUTION INTRA-ARTICULAR; INTRALESIONAL; INTRAMUSCULAR; INTRAVENOUS; SOFT TISSUE PRN
Status: DISCONTINUED | OUTPATIENT
Start: 2024-07-25 | End: 2024-07-25

## 2024-07-25 RX ORDER — AMOXICILLIN 250 MG
1-2 CAPSULE ORAL 2 TIMES DAILY
Qty: 30 TABLET | Refills: 0 | Status: SHIPPED | OUTPATIENT
Start: 2024-07-25 | End: 2024-07-31

## 2024-07-25 RX ORDER — FENTANYL CITRATE 50 UG/ML
INJECTION, SOLUTION INTRAMUSCULAR; INTRAVENOUS PRN
Status: DISCONTINUED | OUTPATIENT
Start: 2024-07-25 | End: 2024-07-25

## 2024-07-25 RX ORDER — PROPOFOL 10 MG/ML
INJECTION, EMULSION INTRAVENOUS PRN
Status: DISCONTINUED | OUTPATIENT
Start: 2024-07-25 | End: 2024-07-25

## 2024-07-25 RX ORDER — FENTANYL CITRATE 50 UG/ML
25 INJECTION, SOLUTION INTRAMUSCULAR; INTRAVENOUS EVERY 5 MIN PRN
Status: DISCONTINUED | OUTPATIENT
Start: 2024-07-25 | End: 2024-07-25 | Stop reason: HOSPADM

## 2024-07-25 RX ORDER — CEFAZOLIN SODIUM/WATER 2 G/20 ML
2 SYRINGE (ML) INTRAVENOUS
Status: COMPLETED | OUTPATIENT
Start: 2024-07-25 | End: 2024-07-25

## 2024-07-25 RX ADMIN — OXYCODONE HYDROCHLORIDE 5 MG: 5 TABLET ORAL at 13:12

## 2024-07-25 RX ADMIN — DEXMEDETOMIDINE 40 MCG: 100 INJECTION, SOLUTION INTRAVENOUS at 06:55

## 2024-07-25 RX ADMIN — PROPOFOL 180 MG: 10 INJECTION, EMULSION INTRAVENOUS at 07:36

## 2024-07-25 RX ADMIN — Medication 2 G: at 07:44

## 2024-07-25 RX ADMIN — FENTANYL CITRATE 25 MCG: 50 INJECTION, SOLUTION INTRAMUSCULAR; INTRAVENOUS at 11:47

## 2024-07-25 RX ADMIN — BUPIVACAINE HYDROCHLORIDE 20 ML: 2.5 INJECTION, SOLUTION EPIDURAL; INFILTRATION; INTRACAUDAL; PERINEURAL at 06:55

## 2024-07-25 RX ADMIN — PHENYLEPHRINE HYDROCHLORIDE 100 MCG: 10 INJECTION INTRAVENOUS at 10:10

## 2024-07-25 RX ADMIN — DEXAMETHASONE SODIUM PHOSPHATE 2 MG: 10 INJECTION, SOLUTION INTRAMUSCULAR; INTRAVENOUS at 06:55

## 2024-07-25 RX ADMIN — BUPIVACAINE HYDROCHLORIDE AND EPINEPHRINE BITARTRATE 40 ML: 2.5; .005 INJECTION, SOLUTION INFILTRATION; PERINEURAL at 06:55

## 2024-07-25 RX ADMIN — EPHEDRINE SULFATE 5 MG: 5 INJECTION INTRAVENOUS at 08:17

## 2024-07-25 RX ADMIN — FENTANYL CITRATE 25 MCG: 50 INJECTION, SOLUTION INTRAMUSCULAR; INTRAVENOUS at 11:05

## 2024-07-25 RX ADMIN — PHENYLEPHRINE HYDROCHLORIDE 150 MCG: 10 INJECTION INTRAVENOUS at 08:15

## 2024-07-25 RX ADMIN — PHENYLEPHRINE HYDROCHLORIDE 100 MCG: 10 INJECTION INTRAVENOUS at 09:30

## 2024-07-25 RX ADMIN — DEXAMETHASONE SODIUM PHOSPHATE 6 MG: 4 INJECTION, SOLUTION INTRA-ARTICULAR; INTRALESIONAL; INTRAMUSCULAR; INTRAVENOUS; SOFT TISSUE at 07:47

## 2024-07-25 RX ADMIN — SODIUM CHLORIDE, POTASSIUM CHLORIDE, SODIUM LACTATE AND CALCIUM CHLORIDE: 600; 310; 30; 20 INJECTION, SOLUTION INTRAVENOUS at 07:33

## 2024-07-25 RX ADMIN — FENTANYL CITRATE 50 MCG: 50 INJECTION INTRAMUSCULAR; INTRAVENOUS at 08:10

## 2024-07-25 RX ADMIN — PROPOFOL 20 MG: 10 INJECTION, EMULSION INTRAVENOUS at 08:46

## 2024-07-25 RX ADMIN — FENTANYL CITRATE 100 MCG: 50 INJECTION INTRAMUSCULAR; INTRAVENOUS at 07:33

## 2024-07-25 RX ADMIN — PHENYLEPHRINE HYDROCHLORIDE 100 MCG: 10 INJECTION INTRAVENOUS at 08:07

## 2024-07-25 RX ADMIN — Medication 200 MG: at 10:25

## 2024-07-25 RX ADMIN — Medication 50 MG: at 07:36

## 2024-07-25 RX ADMIN — LIDOCAINE HYDROCHLORIDE 80 MG: 20 INJECTION, SOLUTION INFILTRATION; PERINEURAL at 07:33

## 2024-07-25 RX ADMIN — FENTANYL CITRATE 50 MCG: 50 INJECTION, SOLUTION INTRAMUSCULAR; INTRAVENOUS at 07:13

## 2024-07-25 RX ADMIN — HYDROMORPHONE HYDROCHLORIDE 0.5 MG: 1 INJECTION, SOLUTION INTRAMUSCULAR; INTRAVENOUS; SUBCUTANEOUS at 09:11

## 2024-07-25 RX ADMIN — ONDANSETRON 4 MG: 2 INJECTION INTRAMUSCULAR; INTRAVENOUS at 09:58

## 2024-07-25 RX ADMIN — MIDAZOLAM 2 MG: 1 INJECTION INTRAMUSCULAR; INTRAVENOUS at 07:13

## 2024-07-25 RX ADMIN — ACETAMINOPHEN 975 MG: 325 TABLET, FILM COATED ORAL at 06:46

## 2024-07-25 RX ADMIN — PHENYLEPHRINE HYDROCHLORIDE 100 MCG: 10 INJECTION INTRAVENOUS at 09:40

## 2024-07-25 RX ADMIN — DEXMEDETOMIDINE HYDROCHLORIDE 12 MCG: 100 INJECTION, SOLUTION INTRAVENOUS at 10:22

## 2024-07-25 RX ADMIN — Medication 20 MG: at 09:43

## 2024-07-25 RX ADMIN — NALBUPHINE HYDROCHLORIDE 5 MG: 10 INJECTION, SOLUTION INTRAMUSCULAR; INTRAVENOUS; SUBCUTANEOUS at 11:57

## 2024-07-25 RX ADMIN — FOSAPREPITANT 150 MG: 150 INJECTION, POWDER, LYOPHILIZED, FOR SOLUTION INTRAVENOUS at 06:57

## 2024-07-25 RX ADMIN — FENTANYL CITRATE 50 MCG: 50 INJECTION INTRAMUSCULAR; INTRAVENOUS at 08:25

## 2024-07-25 RX ADMIN — PROPOFOL 25 MCG/KG/MIN: 10 INJECTION, EMULSION INTRAVENOUS at 07:54

## 2024-07-25 RX ADMIN — SODIUM CHLORIDE, POTASSIUM CHLORIDE, SODIUM LACTATE AND CALCIUM CHLORIDE: 600; 310; 30; 20 INJECTION, SOLUTION INTRAVENOUS at 09:15

## 2024-07-25 RX ADMIN — Medication 20 MG: at 08:22

## 2024-07-25 RX ADMIN — PHENYLEPHRINE HYDROCHLORIDE 150 MCG: 10 INJECTION INTRAVENOUS at 09:18

## 2024-07-25 ASSESSMENT — ACTIVITIES OF DAILY LIVING (ADL)
ADLS_ACUITY_SCORE: 29
ADLS_ACUITY_SCORE: 27
ADLS_ACUITY_SCORE: 29

## 2024-07-25 ASSESSMENT — ENCOUNTER SYMPTOMS
SEIZURES: 0
DYSRHYTHMIAS: 0

## 2024-07-25 NOTE — OP NOTE
PREOPERATIVE DIAGNOSIS: Symptomatic bilateral breast hypertrophy.     POSTOPERATIVE DIAGNOSIS: Symptomatic bilateral breast hypertrophy.     PROCEDURES: Bilateral superomedial pedicle inverted T skin closure breast reduction.     SURGEON: Naya Bourne MD.     RESIDENT: Agustin Wallace MD    PA: Monisha Jones (extra help with retraction and closure)    ANESTHESIA: General anesthesia with LMA.    COMPLICATIONS: Nil.     DRAINS: Nil.     Blood Loss: 250 mL    SPECIMENS: Skin and breast tissue from right breast measuring about 1754 g, left breast measuring about 1546 g.     DESCRIPTION OF PROCEDURE: After informed consent was taken, the proper site and procedure was ascertained with the patient and was appropriately marked and taken to in the operating room.  She was placed in supine position with the knees comfortably flexed with pillows underneath them, and pneumoboots placed and running prior to induction of anesthesia. Preoperative antibiotics given in the OR. All pressure points were appropriately padded. General anesthesia was administered without any complications. She was placed in such a position that she could be flexed to about 50 degrees. Her arms were padded and abducted to about 50 degrees. She was prepped and draped in a standard surgical fashion. I began by first remarking the preop markings and marking a 42 mm areola on each side. I then marked out a superior medial pedicle on each side. I then de-epithelialized the pedicle on each side. I then dissected out the pedicle on each side without actually seeing the deep fascia and also released the pedicle such that it could rotate into its new nipple position without any tension. I then went ahead and on each side excised the inferomedial, inferior, inferolateral and lateral aspects of the breast according to a vertical pattern reduction. Strict hemostasis was ensured during this entire part of the case. Once this was done, I then temporarily closed  the patient's breast in an inverted T fashion, sat the patient up ensured symmetry and then marked out the new areolar opening symmetrically on each side. I then went ahead and closed the horizontal incision using 2-0 Monocryl suture in a deep dermal layer. Then I made sure that the nipple areolar complex could be retrieved without any tension, which is could on each side. I then checked that they were both pink and viable, which they were. I then went ahead and excised the skin of the new areolar opening and de-epithelialized epithelialized the portion that was involved in the pedicle on each side. I then sutured in the nipple areolar complex using 2-0 Monocryl suture in a deep dermal fashion circumferentially. I then closed the vertical incision using 2-0 Monocryl suture to approximate the medial and lateral pillars, and then the deep dermis. I then ran all the incisions with 3-0 Stratafix suture in a running intracuticular manner followed by placement of Prineo, and then followed by an ACE wrap. At the end of the case, the patient's breasts were soft, nipples were pink, and breasts were symmetric. The patient tolerated the procedure well. All counts correct at the end of the case. The patient was extubated and sent to recovery room in a stable condition.

## 2024-07-25 NOTE — ANESTHESIA PROCEDURE NOTES
Airway       Patient location during procedure: OR       Procedure Start/Stop Times: 7/25/2024 7:38 AM  Staff -        CRNA: Blanca Lyon APRN CRNA       Performed By: CRNA  Consent for Airway        Urgency: elective  Indications and Patient Condition       Indications for airway management: jia-procedural       Induction type:intravenous       Mask difficulty assessment: 1 - vent by mask    Final Airway Details       Final airway type: endotracheal airway       Successful airway: ETT - single and Oral  Endotracheal Airway Details        ETT size (mm): 7.0       Cuffed: yes       Successful intubation technique: direct laryngoscopy       DL Blade Type: Clemente 2       Grade View of Cords: 1       Adjucts: stylet       Position: Right       Measured from: gums/teeth       Secured at (cm): 21       Bite block used: Soft    Post intubation assessment        Placement verified by: capnometry and chest rise        Number of attempts at approach: 1       Secured with: tape       Ease of procedure: easy       Dentition: Intact and Unchanged    Medication(s) Administered   Medication Administration Time: 7/25/2024 7:38 AM

## 2024-07-25 NOTE — BRIEF OP NOTE
[facility] Brief Operative Note    Pre-operative diagnosis: Hypertrophy of breast [N62]   Post-operative diagnosis * No post-op diagnosis entered *     Procedure: Procedure(s):  MAMMOPLASTY, REDUCTION, BILATERAL   Surgeon(s): Surgeons and Role:     * ELIAN Bourne MD - Primary     * Monisha Jones PA-C - Resident - Assisting     * Agustin Lora MD - Resident - Assisting   Estimated blood loss: 250 mL   1000ml IVF   Specimens: ID Type Source Tests Collected by Time Destination   1 : Right Breast Tissue Breast, Right SURGICAL PATHOLOGY EXAM ELIAN Bourne MD 7/25/2024  8:14 AM    2 : Left Breast Tissue Breast, Left SURGICAL PATHOLOGY EXAM ELIAN Bourne MD 7/25/2024  8:15 AM       Findings: none

## 2024-07-25 NOTE — DISCHARGE INSTRUCTIONS
Breast Reduction Post Op Instructions     General:   Have someone at home with you for the first 24 hours. You may need additional support for the first week.   Get plenty of rest and eat a balanced diet.   Drink plenty of fluids to help with healing and to help prevent constipation.    Avoid alcohol for a minimum of 3 weeks after surgery as it can cause fluid retention.   No smoking or any nicotine products.      Activity:   Start walking around your house as soon as possible, this will help reduce swelling and decrease risk of blood clots.   You may use your arms for activities of daily living (eating, drinking, dressing, showering, etc.) but avoid overuse of arms as it may increase swelling.    Avoid strenuous activities, no working out for 4-6 weeks post op.  In general, you can drive around 1-2 weeks post op. You must be off all narcotic pain medications and able to maneuver arms quickly to react.    No heavy lifting, nothing greater than 10lbs until 6 weeks post op.     Medications:    You can take Ibuprofen 400-800 mg and Tylenol 650 mg for pain relief. Please take each every 6 hours, and for optimal pain relief - please stagger the medications so that you are taking one or the other every 3 hours. It is best to make a chart/schedule of this to stay organized. If you are taking additional pain medications, please do not exceed 4000 mg of Tylenol daily from all sources.   You have been prescribed additional pain meds such as narcotics and muscle relaxants, please take as instructed and as needed. Take these medications in addition to tylenol/ibuprofen.    If you are taking narcotic medications, please do not operate heavy machinery or drive.      Incision Care:   Incisions have a tape and glue in place. Please leave in place for 2-3 weeks. If it starts to peel off on it s own you can trim back the tape.    Ok to moisturize incisions with aquafor or vaseline around 10 days post op.    Ok to get incisions wet in  the shower. No soaking in tubs or baths for 6 weeks or until all incisions/wounds have healed.   Please wear an ace wrap or sports bra for compression for 6 weeks. Avoid underwire bras during this time.   Monitor incisions daily for signs of infection such as spreading redness to skin.   Some drainage from incisions can be expected. Most common area for drainage is under the middle portion of the breast. Use gauze, bandaids or pads as needed to catch this drainage.     What to Expect:  You may experience temporary soreness, bruising, swelling and tightness in the breasts as well as discomfort in the incision area.    You may have decreased sensation to the nipples. Generally this improves with time but can be permanent. Occasionally people have hypersensitivity to the nipples, this will resolve over the next 6 weeks.     You may have random shooting pains, tingling, or other strange sensations in the skin for a few months. These will subside.   The breasts will be firm and swollen initially but will settle and soften over time     Follow up:   Follow up with your Surgeon or Physician Assistant will be 2 weeks after surgery     When to call:   Please call the office and/or consider return to the ER or call 911 if you experience worsening pain not relieved by medications, increased swelling, redness to skin or high fevers >101F or if there are unexpected problems like shortness of breath.   Contact us on [employer] Monday - Friday, 8 a.m. - 4:30 p.m. or call [phone] to speak with our nursing team   After hours and on weekends, call Hospital Paging at [phone], and ask for the Plastic Surgery Resident on call

## 2024-07-25 NOTE — ANESTHESIA CARE TRANSFER NOTE
Patient: Mary Peter    Procedure: Procedure(s):  MAMMOPLASTY, REDUCTION, BILATERAL       Diagnosis: Hypertrophy of breast [N62]  Diagnosis Additional Information: No value filed.    Anesthesia Type:   General     Note:    Oropharynx: oropharynx clear of all foreign objects and spontaneously breathing  Level of Consciousness: drowsy  Oxygen Supplementation: room air    Independent Airway: airway patency satisfactory and stable  Dentition: dentition unchanged  Vital Signs Stable: post-procedure vital signs reviewed and stable  Report to RN Given: handoff report given  Patient transferred to: PACU    Handoff Report: Identifed the Patient, Identified the Reponsible Provider, Reviewed the pertinent medical history, Discussed the surgical course, Reviewed Intra-OP anesthesia mangement and issues during anesthesia, Set expectations for post-procedure period and Allowed opportunity for questions and acknowledgement of understanding      Vitals:  Vitals Value Taken Time   /51 07/25/24 1045   Temp     Pulse 89 07/25/24 1051   Resp 18 07/25/24 1051   SpO2 98 % 07/25/24 1051   Vitals shown include unfiled device data.    Electronically Signed By: NANDO Frost CRNA  July 25, 2024  10:51 AM

## 2024-07-25 NOTE — ANESTHESIA PROCEDURE NOTES
Pectoralis Procedure Note    Pre-Procedure   Staff -        Anesthesiologist:  Rafa Pichardo MD       Resident/Fellow: Jalen Little MD       Performed By: resident       Location: pre-op       Procedure Start/Stop Times: 7/25/2024 6:55 AM and 7/25/2024 7:05 AM       Pre-Anesthestic Checklist: patient identified, IV checked, site marked, risks and benefits discussed, informed consent, monitors and equipment checked, pre-op evaluation, at physician/surgeon's request and post-op pain management  Timeout:       Correct Patient: Yes        Correct Procedure: Yes        Correct Site: Yes        Correct Position: Yes        Correct Laterality: Yes   Procedure Documentation  Procedure: Pectoralis             Pectoralis I and Pectoralis II       Laterality: bilateral       Patient Position: supine       Patient Prep/Sterile Barriers: sterile gloves, mask       Skin prep: Chloraprep       Needle Type: short bevel       Needle Gauge: 21.        Needle Length (millimeters): 110        Ultrasound guided       1. Ultrasound was used to identify targeted nerve, plexus, vascular marker, or fascial plane and place a needle adjacent to it in real-time.       2. Ultrasound was used to visualize the spread of anesthetic in close proximity to the above referenced structure.       3. A permanent image is entered into the patient's record.       4. The visualized anatomic structures appeared normal.       5. There were no apparent abnormal pathologic findings.    Assessment/Narrative         The placement was negative for: blood aspirated and painful injection       Paresthesias: No.       Bolus given via needle..        Secured via.        Insertion/Infusion Method: Single Shot       Complications: none    Medication(s) Administered   Bupivacaine 0.25% w/ 1:200K Epi (Injection) - Injection   40 mL - 7/25/2024 6:55:00 AM  Bupivacaine 0.25% PF (Infiltration) - Infiltration   20 mL - 7/25/2024 6:55:00 AM  Dexmedetomidine 4  "mcg/mL (Perineural) - Perineural   40 mcg - 7/25/2024 6:55:00 AM  Dexamethasone 10 mg/mL PF (Perineural) - Perineural   2 mg - 7/25/2024 6:55:00 AM  Medication Administration Time: 7/25/2024 6:55 AM      FOR [facility] ONLY:   Pain Team Contact information: please page the Pain Team Via [employer]. Search \"Pain\". During daytime hours, please page the attending first. At night please page the resident first.      "

## 2024-07-25 NOTE — OR NURSING
Pt is alert and oriented x4  VSS  Denies pain/nausea/discomfort    However, patient did experience a fainting episode during preop which lasted about 2-3 minutes. Writer did put pt on the monitor and no changes were noted with her vital signs, she was sating above 96% on RA.   According to the patient's mom, pt experiences multiple fainting episodes during the day and it is due to past trauma and stress.    Laurent PAT

## 2024-07-25 NOTE — ANESTHESIA POSTPROCEDURE EVALUATION
Patient: Mary Peter    Procedure: Procedure(s):  MAMMOPLASTY, REDUCTION, BILATERAL       Anesthesia Type:  General    Note:  Disposition: Outpatient   Postop Pain Control: Uneventful            Sign Out: Well controlled pain   PONV: No   Neuro/Psych: Uneventful            Sign Out: Acceptable/Baseline neuro status   Airway/Respiratory: Uneventful            Sign Out: Acceptable/Baseline resp. status   CV/Hemodynamics: Uneventful            Sign Out: Acceptable CV status; No obvious hypovolemia; No obvious fluid overload   Other NRE: NONE   DID A NON-ROUTINE EVENT OCCUR? No           Last vitals:  Vitals Value Taken Time   /69 07/25/24 1215   Temp 36.4  C (97.6  F) 07/25/24 1045   Pulse 69 07/25/24 1227   Resp 11 07/25/24 1227   SpO2 95 % 07/25/24 1227   Vitals shown include unfiled device data.    Electronically Signed By: Flory France MD  July 25, 2024  12:28 PM

## 2024-07-31 ENCOUNTER — MYC REFILL (OUTPATIENT)
Dept: FAMILY MEDICINE | Facility: CLINIC | Age: 24
End: 2024-07-31
Payer: COMMERCIAL

## 2024-07-31 DIAGNOSIS — N62 HYPERTROPHY OF BREAST: ICD-10-CM

## 2024-07-31 DIAGNOSIS — N62 HYPERTROPHY OF BREAST: Primary | ICD-10-CM

## 2024-07-31 DIAGNOSIS — Z98.890 S/P BILATERAL BREAST REDUCTION: ICD-10-CM

## 2024-07-31 LAB
PATH REPORT.COMMENTS IMP SPEC: NORMAL
PATH REPORT.COMMENTS IMP SPEC: NORMAL
PATH REPORT.FINAL DX SPEC: NORMAL
PATH REPORT.GROSS SPEC: NORMAL
PATH REPORT.MICROSCOPIC SPEC OTHER STN: NORMAL
PATH REPORT.RELEVANT HX SPEC: NORMAL
PHOTO IMAGE: NORMAL

## 2024-07-31 RX ORDER — OXYCODONE HYDROCHLORIDE 5 MG/1
5-10 TABLET ORAL EVERY 6 HOURS PRN
Qty: 20 TABLET | Refills: 0 | Status: SHIPPED | OUTPATIENT
Start: 2024-07-31

## 2024-07-31 RX ORDER — GABAPENTIN 300 MG/1
CAPSULE ORAL
Qty: 42 CAPSULE | Refills: 0 | Status: SHIPPED | OUTPATIENT
Start: 2024-07-31

## 2024-07-31 RX ORDER — HYDROXYZINE HYDROCHLORIDE 25 MG/1
25 TABLET, FILM COATED ORAL 3 TIMES DAILY PRN
Qty: 25 TABLET | Refills: 0 | Status: SHIPPED | OUTPATIENT
Start: 2024-07-31

## 2024-07-31 RX ORDER — AMOXICILLIN 250 MG
1-2 CAPSULE ORAL 2 TIMES DAILY
Qty: 30 TABLET | Refills: 0 | Status: SHIPPED | OUTPATIENT
Start: 2024-07-31

## 2024-08-14 ENCOUNTER — OFFICE VISIT (OUTPATIENT)
Dept: PLASTIC SURGERY | Facility: CLINIC | Age: 24
End: 2024-08-14
Payer: COMMERCIAL

## 2024-08-14 VITALS
SYSTOLIC BLOOD PRESSURE: 119 MMHG | OXYGEN SATURATION: 97 % | HEART RATE: 119 BPM | WEIGHT: 181 LBS | HEIGHT: 62 IN | BODY MASS INDEX: 33.31 KG/M2 | TEMPERATURE: 97.8 F | DIASTOLIC BLOOD PRESSURE: 87 MMHG

## 2024-08-14 DIAGNOSIS — Z98.890 S/P BILATERAL BREAST REDUCTION: Primary | ICD-10-CM

## 2024-08-14 PROCEDURE — 99024 POSTOP FOLLOW-UP VISIT: CPT | Performed by: PLASTIC SURGERY

## 2024-08-14 ASSESSMENT — PAIN SCALES - GENERAL: PAINLEVEL: SEVERE PAIN (7)

## 2024-08-14 NOTE — LETTER
8/14/2024       RE: Mary Peter  [address]  [place] [address]     Dear Colleague,    Thank you for referring your patient, Mary Peter, to the [facility] [place] [facility]. Please see a copy of my visit note below.    PRESENTING COMPLAINT:  Post-operative visit s/p bilateral breast reduction on 7/25/2024     HISTORY OF PRESENTING COMPLAINT: The patient is here for post-operative visit.  The patient is being seen in the presence of my nurse.     Healing well.  No issues with healing.  Has been very anxious about healing and has had pain issues, and has been reluctant to use her arms pretty normally.  Pathology benign.    On exam: Vital signs stable afebrile.  Incisions healing and well.  Breasts are symmetric aesthetic and healing well.     ASSESSMENT AND PLAN:  Based upon the above findings, the patient is here for post-operative visit.     Advised patient to moisturize and use her arms pretty normally.  2 more weeks of restrictions and then no restrictions.  Get to know her breast well.  Reassured her.  See us back as needed.    All questions were answered.  The patient was happy with the visit.      Again, thank you for allowing me to participate in the care of your patient.      Sincerely,    ELIAN Bourne MD

## 2024-08-14 NOTE — PROGRESS NOTES
PRESENTING COMPLAINT:  Post-operative visit s/p bilateral breast reduction on 7/25/2024     HISTORY OF PRESENTING COMPLAINT: The patient is here for post-operative visit.  The patient is being seen in the presence of my nurse.     Healing well.  No issues with healing.  Has been very anxious about healing and has had pain issues, and has been reluctant to use her arms pretty normally.  Pathology benign.    On exam: Vital signs stable afebrile.  Incisions healing and well.  Breasts are symmetric aesthetic and healing well.     ASSESSMENT AND PLAN:  Based upon the above findings, the patient is here for post-operative visit.     Advised patient to moisturize and use her arms pretty normally.  2 more weeks of restrictions and then no restrictions.  Get to know her breast well.  Reassured her.  See us back as needed.    All questions were answered.  The patient was happy with the visit.

## 2024-08-14 NOTE — NURSING NOTE
"Chief Complaint   Patient presents with    Surgical Followup     Post-op, DOS 7/26/24.       Vitals:    08/14/24 0824   BP: 119/87   BP Location: Left arm   Patient Position: Chair   Cuff Size: Adult Regular   Pulse: 119   Temp: 97.8  F (36.6  C)   TempSrc: Oral   SpO2: 97%   Weight: 82.1 kg (181 lb)   Height: 1.575 m (5' 2\")       Body mass index is 33.11 kg/m .      Yvonne Wilcox LPN    "
Detail Level: Simple
Additional Notes: Patient consent was obtained to proceed with the visit and recommended plan of care after discussion of all risks and benefits, including the risks of COVID-19 exposure.

## 2024-10-03 ASSESSMENT — SLEEP AND FATIGUE QUESTIONNAIRES
HOW LIKELY ARE YOU TO NOD OFF OR FALL ASLEEP WHILE SITTING AND TALKING TO SOMEONE: SLIGHT CHANCE OF DOZING
HOW LIKELY ARE YOU TO NOD OFF OR FALL ASLEEP WHILE LYING DOWN TO REST IN THE AFTERNOON WHEN CIRCUMSTANCES PERMIT: HIGH CHANCE OF DOZING
HOW LIKELY ARE YOU TO NOD OFF OR FALL ASLEEP IN A CAR, WHILE STOPPED FOR A FEW MINUTES IN TRAFFIC: WOULD NEVER DOZE
HOW LIKELY ARE YOU TO NOD OFF OR FALL ASLEEP WHILE SITTING QUIETLY AFTER LUNCH WITHOUT ALCOHOL: HIGH CHANCE OF DOZING
HOW LIKELY ARE YOU TO NOD OFF OR FALL ASLEEP WHILE WATCHING TV: HIGH CHANCE OF DOZING
HOW LIKELY ARE YOU TO NOD OFF OR FALL ASLEEP WHEN YOU ARE A PASSENGER IN A CAR FOR AN HOUR WITHOUT A BREAK: MODERATE CHANCE OF DOZING
HOW LIKELY ARE YOU TO NOD OFF OR FALL ASLEEP WHILE SITTING AND READING: MODERATE CHANCE OF DOZING
HOW LIKELY ARE YOU TO NOD OFF OR FALL ASLEEP WHILE SITTING INACTIVE IN A PUBLIC PLACE: HIGH CHANCE OF DOZING

## 2024-10-06 ENCOUNTER — THERAPY VISIT (OUTPATIENT)
Dept: SLEEP MEDICINE | Facility: CLINIC | Age: 24
End: 2024-10-06
Attending: PHYSICIAN ASSISTANT
Payer: COMMERCIAL

## 2024-10-06 DIAGNOSIS — R53.83 MALAISE AND FATIGUE: ICD-10-CM

## 2024-10-06 DIAGNOSIS — G47.09 OTHER INSOMNIA: ICD-10-CM

## 2024-10-06 DIAGNOSIS — R53.81 MALAISE AND FATIGUE: ICD-10-CM

## 2024-10-06 DIAGNOSIS — E66.09 CLASS 1 OBESITY DUE TO EXCESS CALORIES WITH BODY MASS INDEX (BMI) OF 34.0 TO 34.9 IN ADULT, UNSPECIFIED WHETHER SERIOUS COMORBIDITY PRESENT: ICD-10-CM

## 2024-10-06 DIAGNOSIS — R06.00 DYSPNEA AND RESPIRATORY ABNORMALITY: ICD-10-CM

## 2024-10-06 DIAGNOSIS — R06.83 LOUD SNORING: ICD-10-CM

## 2024-10-06 DIAGNOSIS — E66.811 CLASS 1 OBESITY DUE TO EXCESS CALORIES WITH BODY MASS INDEX (BMI) OF 34.0 TO 34.9 IN ADULT, UNSPECIFIED WHETHER SERIOUS COMORBIDITY PRESENT: ICD-10-CM

## 2024-10-06 DIAGNOSIS — G47.33 OSA (OBSTRUCTIVE SLEEP APNEA): Primary | ICD-10-CM

## 2024-10-06 DIAGNOSIS — R06.89 DYSPNEA AND RESPIRATORY ABNORMALITY: ICD-10-CM

## 2024-10-06 DIAGNOSIS — Z72.820 LACK OF ADEQUATE SLEEP: ICD-10-CM

## 2024-10-06 DIAGNOSIS — G47.30 SLEEP APNEA, UNSPECIFIED TYPE: ICD-10-CM

## 2024-10-06 PROCEDURE — 95810 POLYSOM 6/> YRS 4/> PARAM: CPT | Performed by: INTERNAL MEDICINE

## 2024-10-14 PROBLEM — E66.01 CLASS 2 SEVERE OBESITY DUE TO EXCESS CALORIES WITH SERIOUS COMORBIDITY IN ADULT (H): Chronic | Status: ACTIVE | Noted: 2024-07-10

## 2024-10-14 PROBLEM — R10.30 LOWER ABDOMINAL PAIN: Status: RESOLVED | Noted: 2021-05-03 | Resolved: 2024-10-14

## 2024-10-14 PROBLEM — F51.04 CHRONIC INSOMNIA: Status: ACTIVE | Noted: 2024-10-14

## 2024-10-14 PROBLEM — G47.33 OSA (OBSTRUCTIVE SLEEP APNEA): Chronic | Status: ACTIVE | Noted: 2024-10-14

## 2024-10-14 PROBLEM — E66.812 CLASS 2 SEVERE OBESITY DUE TO EXCESS CALORIES WITH SERIOUS COMORBIDITY IN ADULT (H): Chronic | Status: ACTIVE | Noted: 2024-07-10

## 2024-10-14 NOTE — PROCEDURES
" SLEEP STUDY INTERPRETATION  DIAGNOSTIC POLYSOMNOGRAPHY REPORT      Patient: IMTIAZ MORENO  YOB: 2000  Study Date: 10/6/2024  MRN: [id number]  Referring Provider: NANDO Flores, CNP  Ordering Provider: Jared TIJERINA    Indications for Polysomnography: The patient is a 24 year old Female who is 5' 1\" and weighs 185.0 lbs. Her BMI is 35.4, Forrest sleepiness scale 16 and neck circumference is 35 cm.   A diagnostic polysomnogram was performed to evaluate for  loud snoring, witnessed apnea, non-refreshing sleep, daytime fatigue/sleepiness (ESS 16), difficulty maintaining sleep and family history of DELLA.    Polysomnogram Data: A full night polysomnogram recorded the standard physiologic parameters including EEG, EOG, EMG, ECG, nasal and oral airflow. Respiratory parameters of chest and abdominal movements were recorded with respiratory inductance plethysmography. Oxygen saturation was recorded by pulse oximetry. Hypopnea scoring rule used: 1B 4%.    Sleep Architecture:   The total recording time of the polysomnogram was 531.5 minutes. The total sleep time was 388.0 minutes. Sleep latency was 108.0 minutes while using phone for 100 minutes without the use of a sleep aid. REM latency was 71.0 minutes. Arousal index was increased at 72.8 arousals per hour. Sleep efficiency was decreased at 73.0%. Wake after sleep onset was 35.5 minutes. The patient spent 2.7% of total sleep time in Stage N1, 60.4% in Stage N2, 9.9% in Stage N3, and 26.9% in REM. Time in REM supine was 0 minutes.    Respiration:    Events ? The polysomnogram revealed a presence of 315 obstructive, 8 central, and 10 mixed apneas resulting in an apnea index of 51.5 events per hour. There were 15 obstructive hypopneas and 0 central hypopneas resulting in an obstructive hypopnea index of 2.3 and central hypopnea index of 0 events per hour. The combined apnea/hypopnea index was 53.8 events per hour (central apnea/hypopnea index was 1.2 " events per hour). The REM AHI was 0 events per hour. The supine AHI was 120.0 (30 minutes sleep supine), non-supine AHI 48.2 events per hour. The RERA index was 10.1 events per hour.  The RDI was 63.9 events per hour.  Snoring - was reported as mild to moderate  Respiratory rate and pattern - was notable for normal respiratory rate and pattern.  Sustained Sleep Associated Hypoventilation - Transcutaneous carbon dioxide monitoring was not used, however significant hypoventilation was not suggested by oximetry   Sleep Associated Hypoxemia - (Greater than 5 minutes O2 sat at or below 88%) was not present. Baseline oxygen saturation was 96.5%. Lowest oxygen saturation was 89.0%. Time spent less than or equal to 88% was 0 minutes. Time spent less than or equal to 89% was 0.4 minutes.    Movement Activity:    Periodic Limb Activity - There were 0 PLMs during the entire study.   REM EMG Activity - Excessive transient/sustained muscle activity was not present.  Nocturnal Behavior - Abnormal sleep related behaviors were not noted    Bruxism - None apparent.        Cardiac Summary:    The average pulse rate was 69.9 bpm. The minimum pulse rate was 46.0 bpm while the maximum pulse rate was 115.0 bpm.  Arrhythmias were not noted.      Assessment:   Severe obstructive sleep apnea without hypoxemia  Unusual phenotype with complete sparing of REM, no REM-related breathing events    Recommendations:  Consider repeat polysomnography with full night titration of positive airway pressure therapy for the control of sleep disordered breathing.  Treatment could be empirically initiated with Auto?titrating PAP therapy with a range of 5 to 18 cmH2O. Recommend clinical follow up with sleep management team.  Patient may be a candidate for dental appliance through referral to Sleep Dentistry for the treatment of obstructive sleep apnea and/or socially disruptive snoring.  If devices are not acceptable or effective, patient may benefit from  evaluation of possible surgical options. If she is interested, would recommend referral to specialized ENT-Sleep provider.  Advice regarding the risks of drowsy driving.  Suggest optimizing sleep schedule    Weight management (if BMI > 30).      Diagnostic Codes:   Obstructive Sleep Apnea G47.33            _____________________________________   Electronically Signed By: Don Thapa MD 10/14/24           Range(%) Time in range (min)   0.0 - 89.0 0.4   0.0 - 88.0 -         Stage Min(mm Hg) Max(mm Hg)   Wake - -   NREM(1+2+3) - -   REM - -       Range(mmHg) Time in range (min)   55.0 - 100.0 -   Excluded data <20.0 & >65.0 531.5

## 2024-10-15 LAB — SLPCOMP: NORMAL

## 2024-10-25 ENCOUNTER — DOCUMENTATION ONLY (OUTPATIENT)
Dept: SLEEP MEDICINE | Facility: CLINIC | Age: 24
End: 2024-10-25
Payer: COMMERCIAL

## 2024-10-25 DIAGNOSIS — G47.33 OSA (OBSTRUCTIVE SLEEP APNEA): Primary | Chronic | ICD-10-CM

## 2024-10-25 DIAGNOSIS — F51.04 CHRONIC INSOMNIA: ICD-10-CM

## 2024-10-25 NOTE — PROGRESS NOTES
Patient was offered choice of vendor and chose [facility].  Patient Mary Peter was set up at [place]  on October 25, 2024. Patient received a Resmed Airsense 11 Pressures were set at  5-15 cm H2O.   Patient s ramp is 5 cm H2O for Auto and FLEX/EPR is EPR, 2.  Patient received a Resmed Mask name: AIRFIT N20  Nasal mask size Small, heated tubing and heated humidifier.  Patient has the following compliance requirements: usage only    Alex Garcia

## 2024-10-29 ENCOUNTER — DOCUMENTATION ONLY (OUTPATIENT)
Dept: SLEEP MEDICINE | Facility: CLINIC | Age: 24
End: 2024-10-29
Payer: COMMERCIAL

## 2024-10-29 DIAGNOSIS — F51.04 CHRONIC INSOMNIA: Primary | ICD-10-CM

## 2024-10-29 DIAGNOSIS — G47.33 OSA (OBSTRUCTIVE SLEEP APNEA): Chronic | ICD-10-CM

## 2024-10-29 NOTE — PROGRESS NOTES
3 day Sleep therapy management telephone visit    Diagnostic AHI:PS.8         Confirmed with patient at time of call- Yes Patient is still interested in STM service       Subjective measures:  Spoke with patient and her mother. Patient face breaking out from her CPAP mask.  She is going to use an T-Shirt as a barrier between the skin and mask. Patient feels like she is not getting enough CPAP pressure at first I turned her ramp off. Patient will call if she has questions or concerns.          Objective data     Order Settings for PAP  CPAP min     CPAP max     CPAP fixed         Device settings from machine CPAP min 5.0     CPAP max 15.0      CPAP fixed      EPR Setting TWO    RESMED soft response  OFF     Assessment: Nighty usage most nights over four hours      Patient has the following upcoming sleep appts:  Future Sleep Appointments         Provider Department    2024 10:00 AM (Arrive by 9:45 AM) Kevin Coleman PA [facility] [place]            Replacement device: No  STM ordered by provider: Yes     Total time spent on accessing and  interpreting remote patient PAP therapy data  10 minutes    Total time spent counseling, coaching  and reviewing PAP therapy data with patient  9 minutes    [id number] no

## 2024-11-12 ENCOUNTER — APPOINTMENT (OUTPATIENT)
Dept: SLEEP MEDICINE | Facility: CLINIC | Age: 24
End: 2024-11-12
Payer: COMMERCIAL

## 2024-11-21 ENCOUNTER — VIRTUAL VISIT (OUTPATIENT)
Dept: SLEEP MEDICINE | Facility: CLINIC | Age: 24
End: 2024-11-21
Attending: PHYSICIAN ASSISTANT
Payer: COMMERCIAL

## 2024-11-21 VITALS — BODY MASS INDEX: 34.93 KG/M2 | WEIGHT: 185 LBS | HEIGHT: 61 IN

## 2024-11-21 DIAGNOSIS — Z91.199 FAILURE TO ATTEND APPOINTMENT: ICD-10-CM

## 2024-11-21 DIAGNOSIS — F51.04 CHRONIC INSOMNIA: Primary | ICD-10-CM

## 2024-11-21 DIAGNOSIS — G47.33 OSA (OBSTRUCTIVE SLEEP APNEA): ICD-10-CM

## 2024-11-21 ASSESSMENT — SLEEP AND FATIGUE QUESTIONNAIRES
HOW LIKELY ARE YOU TO NOD OFF OR FALL ASLEEP WHEN YOU ARE A PASSENGER IN A CAR FOR AN HOUR WITHOUT A BREAK: MODERATE CHANCE OF DOZING
HOW LIKELY ARE YOU TO NOD OFF OR FALL ASLEEP WHILE SITTING INACTIVE IN A PUBLIC PLACE: MODERATE CHANCE OF DOZING
HOW LIKELY ARE YOU TO NOD OFF OR FALL ASLEEP WHILE LYING DOWN TO REST IN THE AFTERNOON WHEN CIRCUMSTANCES PERMIT: HIGH CHANCE OF DOZING
HOW LIKELY ARE YOU TO NOD OFF OR FALL ASLEEP WHILE SITTING QUIETLY AFTER LUNCH WITHOUT ALCOHOL: HIGH CHANCE OF DOZING
HOW LIKELY ARE YOU TO NOD OFF OR FALL ASLEEP WHILE SITTING AND READING: SLIGHT CHANCE OF DOZING
HOW LIKELY ARE YOU TO NOD OFF OR FALL ASLEEP WHILE WATCHING TV: MODERATE CHANCE OF DOZING
HOW LIKELY ARE YOU TO NOD OFF OR FALL ASLEEP IN A CAR, WHILE STOPPED FOR A FEW MINUTES IN TRAFFIC: WOULD NEVER DOZE
HOW LIKELY ARE YOU TO NOD OFF OR FALL ASLEEP WHILE SITTING AND TALKING TO SOMEONE: MODERATE CHANCE OF DOZING

## 2024-11-21 ASSESSMENT — PAIN SCALES - GENERAL: PAINLEVEL_OUTOF10: NO PAIN (0)

## 2024-11-21 NOTE — NURSING NOTE
Current patient location: [address] [place] [address]    Is the patient currently in the state of [place]? YES    Visit mode:VIDEO    If the visit is dropped, the patient can be reconnected by:VIDEO VISIT: Text to cell phone:   Telephone Information:   Mobile [phone]   Mobile Not on file.       Will anyone else be joining the visit? NO  (If patient encounters technical issues they should call [phone]    Are changes needed to the allergy or medication list? No    Are refills needed on medications prescribed by this physician? NO    Rooming Documentation:  Questionnaire(s) completed    Reason for visit: Consult    Brandon LANGFORD

## 2024-11-21 NOTE — Clinical Note
Hello,  You referred this patient.  Attempted consult but at time of visit she was in a verbal fight with her mother and refused the visit.  I have strong doubts this will be a successful referral.

## 2024-11-21 NOTE — Clinical Note
Mekhi Brown,   you referred this patient to me and at the time of the visit I observed a high intensity conflict, verbal between her and her mother.  We were not able to proceed, she was refusing the visit and her mother was trying to insist.  She has severe sleep apnea and unfortunately when she sees you in follow-up I would asked that you not refer her back to my clinic as I very much doubt she would be able to benefit from my service or care.  Thank you, Alex

## 2024-11-21 NOTE — PROGRESS NOTES
Virtual Visit Details    Type of service:  Video Visit       Originating Location (pt. Location): Home    Distant Location (provider location):  Off-site  [facility]    Patient was in conflict with her mother at time of the visit.   Patient, an adult declined the visit but mother was urging her to proceed.  Was not able to consent mother to participate.      Shahbaz Johnson, Kalli, LP, DBSM  Diplomate, Behavioral Sleep Medicine  [facility]

## 2024-12-02 ENCOUNTER — DOCUMENTATION ONLY (OUTPATIENT)
Dept: SLEEP MEDICINE | Facility: CLINIC | Age: 24
End: 2024-12-02
Payer: COMMERCIAL

## 2024-12-02 DIAGNOSIS — G47.33 OSA (OBSTRUCTIVE SLEEP APNEA): Primary | Chronic | ICD-10-CM

## 2024-12-02 DIAGNOSIS — F51.04 CHRONIC INSOMNIA: ICD-10-CM

## 2024-12-04 ENCOUNTER — DOCUMENTATION ONLY (OUTPATIENT)
Dept: SLEEP MEDICINE | Facility: CLINIC | Age: 24
End: 2024-12-04
Payer: COMMERCIAL

## 2024-12-04 DIAGNOSIS — G47.33 OSA (OBSTRUCTIVE SLEEP APNEA): Primary | Chronic | ICD-10-CM

## 2024-12-04 DIAGNOSIS — F51.04 CHRONIC INSOMNIA: ICD-10-CM

## 2024-12-04 NOTE — PROGRESS NOTES
30 DAY [facility] VISIT    Diagnostic AHI: PS.8         Subjective measures:   Spoke with patient and her Mother.  We turned her CPAP ramp of and put in for a pressure change.  Patient feels like she is not getting enough CPAP pressure.      Assessment: Pt meeting objective benchmarks. compliance  Patient failing following subjective benchmarks: pressure issues  Patient has the following upcoming sleep appts:  Future Sleep Appointments         Provider Department    3/3/2025 9:30 AM (Arrive by 9:15 AM) Kevin Coleman PA [facility] [place]          Device type: Auto-CPAP  PAP settings: CPAP min 8.0 cm  H20     CPAP max 15.0 cm  H20    95th% pressure 11.3 cm  H20      RESMED EPR level Setting: TWO    RESMED Soft response setting:  OFF  Mask type:    Objective measures: 14 day rolling measures      Compliance  35 %      Leak  3.12 lpm  last  upload      AHI 2.05   last  upload      Average number of minutes 186      Objective measure goal  Compliance   Goal >70%  Leak   Goal < 24 lpm  AHI  Goal < 5  Usage  Goal >240        Total time spent on accessing and interpreting remote patient PAP therapy data  10 minutes    Total time spent counseling, coaching  and reviewing PAP therapy data with patient  4 minutes     [id number] this call  [id number] no  at 3 or 14 day [facility]

## 2024-12-05 ENCOUNTER — DOCUMENTATION ONLY (OUTPATIENT)
Dept: SLEEP MEDICINE | Facility: CLINIC | Age: 24
End: 2024-12-05
Payer: COMMERCIAL

## 2024-12-05 DIAGNOSIS — G47.33 OSA (OBSTRUCTIVE SLEEP APNEA): Primary | Chronic | ICD-10-CM

## 2025-02-11 ENCOUNTER — TRANSFERRED RECORDS (OUTPATIENT)
Dept: MULTI SPECIALTY CLINIC | Facility: CLINIC | Age: 25
End: 2025-02-11

## 2025-02-11 LAB — CHLAMYDIA - HIM PATIENT REPORTED: NORMAL

## 2025-04-06 ENCOUNTER — MYC MEDICAL ADVICE (OUTPATIENT)
Dept: FAMILY MEDICINE | Facility: CLINIC | Age: 25
End: 2025-04-06
Payer: COMMERCIAL

## 2025-04-07 NOTE — TELEPHONE ENCOUNTER
Responded to the patient through MyChart.     Zoila Cabrera RN  [facility]

## 2025-04-19 ENCOUNTER — HEALTH MAINTENANCE LETTER (OUTPATIENT)
Age: 25
End: 2025-04-19

## 2025-04-29 ENCOUNTER — OFFICE VISIT (OUTPATIENT)
Dept: FAMILY MEDICINE | Facility: CLINIC | Age: 25
End: 2025-04-29
Payer: COMMERCIAL

## 2025-04-29 VITALS
SYSTOLIC BLOOD PRESSURE: 108 MMHG | BODY MASS INDEX: 31.57 KG/M2 | TEMPERATURE: 98.8 F | RESPIRATION RATE: 11 BRPM | HEIGHT: 61 IN | DIASTOLIC BLOOD PRESSURE: 70 MMHG | WEIGHT: 167.2 LBS | OXYGEN SATURATION: 95 % | HEART RATE: 80 BPM

## 2025-04-29 DIAGNOSIS — N30.01 ACUTE CYSTITIS WITH HEMATURIA: Primary | ICD-10-CM

## 2025-04-29 LAB
BACTERIA #/AREA URNS HPF: ABNORMAL /HPF
RBC #/AREA URNS AUTO: ABNORMAL /HPF
SQUAMOUS #/AREA URNS AUTO: ABNORMAL /LPF
WBC #/AREA URNS AUTO: >100 /HPF
WBC CLUMPS #/AREA URNS HPF: PRESENT /HPF

## 2025-04-29 PROCEDURE — 99213 OFFICE O/P EST LOW 20 MIN: CPT

## 2025-04-29 PROCEDURE — 3078F DIAST BP <80 MM HG: CPT

## 2025-04-29 PROCEDURE — 87086 URINE CULTURE/COLONY COUNT: CPT

## 2025-04-29 PROCEDURE — 81015 MICROSCOPIC EXAM OF URINE: CPT

## 2025-04-29 PROCEDURE — 3074F SYST BP LT 130 MM HG: CPT

## 2025-04-29 RX ORDER — NITROFURANTOIN 25; 75 MG/1; MG/1
100 CAPSULE ORAL 2 TIMES DAILY
Qty: 10 CAPSULE | Refills: 0 | Status: SHIPPED | OUTPATIENT
Start: 2025-04-29 | End: 2025-05-04

## 2025-04-29 RX ORDER — FLUOXETINE HYDROCHLORIDE 40 MG/1
40 CAPSULE ORAL DAILY
COMMUNITY
Start: 2025-03-20

## 2025-04-29 RX ORDER — QUETIAPINE FUMARATE 25 MG/1
25 TABLET, FILM COATED ORAL PRN
COMMUNITY
Start: 2025-04-21

## 2025-04-29 ASSESSMENT — PATIENT HEALTH QUESTIONNAIRE - PHQ9
10. IF YOU CHECKED OFF ANY PROBLEMS, HOW DIFFICULT HAVE THESE PROBLEMS MADE IT FOR YOU TO DO YOUR WORK, TAKE CARE OF THINGS AT HOME, OR GET ALONG WITH OTHER PEOPLE: VERY DIFFICULT
SUM OF ALL RESPONSES TO PHQ QUESTIONS 1-9: 18
SUM OF ALL RESPONSES TO PHQ QUESTIONS 1-9: 18

## 2025-04-29 ASSESSMENT — COLUMBIA-SUICIDE SEVERITY RATING SCALE - C-SSRS
1. WITHIN THE PAST MONTH, HAVE YOU WISHED YOU WERE DEAD OR WISHED YOU COULD GO TO SLEEP AND NOT WAKE UP?: YES
6. HAVE YOU EVER DONE ANYTHING, STARTED TO DO ANYTHING, OR PREPARED TO DO ANYTHING TO END YOUR LIFE?: YES, LIFETIME
2. IN THE PAST MONTH, HAVE YOU ACTUALLY HAD ANY THOUGHTS OF KILLING YOURSELF?: NO

## 2025-04-29 NOTE — PROGRESS NOTES
Assessment & Plan     (N30.01) Acute cystitis with hematuria  (primary encounter diagnosis)  Comment: Acute and stable.  Vital signs are stable, patient is not toxic appearing, and no findings that would warrant the need for immediate medical attention.  Urinalysis and physical exam findings are supportive of the diagnosis of acute cystitis with hematuria.  No concerns for pyelonephritis or more profound infection today.  Unfortunately patient has taken Pyridium, so only result available today is the microscopic urinalysis, as the microscopic was not able to be run.  Microscopic does indicated significantly elevated white blood cells and presence of bacteria, which is diagnostically supportive of a urinary tract infection.  Will begin treatment with Macrobid twice daily for 5 days to manage, and awaiting the urine culture the lab will give us a better indication as to whether or not antibiotics need to be adjusted or if they can be stopped. Offered education on medications including appropriate dosing, possible side effects, and possible adverse effects.  Education given on return to clinic instructions as well as alarm signs that would require the need for immediate medical attention.  Patient attested to understanding.  Plan: UA Macroscopic with reflex to Microscopic and         Culture - Lab Collect, UA Microscopic with         Reflex to Culture, Urine Culture,         nitroFURantoin macrocrystal-monohydrate         (MACROBID) 100 MG capsule       This progress note has been dictated, with use of voice recognition software. Any grammatical, typographical, or context errors are unintentional and inherent to use of voice recognition software.     Ordering of each unique test  Prescription drug management  I spent a total of 18 minutes on the day of the visit.   Time spent by me today doing chart review, history and exam, documentation and further activities per the note      Depression Screening Follow Up         4/29/2025     1:51 PM   PHQ   PHQ-9 Total Score 18    Q9: Thoughts of better off dead/self-harm past 2 weeks Several days   F/U: Thoughts of suicide or self-harm No   F/U: Safety concerns No       Patient-reported               4/29/2025     2:28 PM   C-SSRS (Brief [place])   Within the last month, have you wished you were dead or wished you could go to sleep and not wake up? Yes   Within the last month, have you had any actual thoughts of killing yourself? No   Within the last month, have you ever done anything, started to do anything, or prepared to do anything to end your life? Yes, lifetime       Follow Up Actions Taken  Referred patient back to mental health provider  Patient declined referral.      Follow-up   Follow-up in 1 week if symptoms are not improving      Subjective   Mary is a 24 year old, presenting for the following health issues:  Rule out Urinary Tract Infection (Sx started yesterday including retention and dysuria. Mom noticed possible blood in the urine- gave pyridium.)        4/29/2025     2:03 PM   Additional Questions   Roomed by ADILIA Cortez BSN     Via the Health Maintenance questionnaire, the patient has reported the following services have been completed -Chlamydia: [facility] 2025-02-11, this information has been sent to the abstraction team.  History of Present Illness       Reason for visit:  Uti   She is taking medications regularly.          Mary is a 24-year-old female with a past medical history significant for hallucinations, chronic insomnia, anxiety, depression, PTSD, DELLA, dysmenorrhea, allergic rhinitis, and obesity who presents today accompanied by her mom with concerns for urinary tract infection.  Patient reports that beginning around April 28 she was experiencing urinary frequency with small amounts of urine output during each episode.  It was not until the morning of April 29 that the frequency continued, and began to be painful.  She also noted a small amount of  "blood in her urine.  Due to the significant pain, her mom did give her a dose of Pyridium which was very helpful.  She otherwise has had no concerns for fever, abdominal pain, nausea, vomiting, flank pain, chills, or bodyaches.  She is not currently sexually active, and has no concerns for STIs or pregnancy.  Her last menstrual period began on April 12.    Patient does have a history of depression, and her PHQ-9 indicated thoughts of self-harm.  This is not new for patient, and she notes that these thoughts come and go quite frequently for her, but she has no intention or plan for self-harm or suicide.  She is followed by psychiatry once monthly, will be establishing with group therapy in the next week, has a trauma therapist, and takes Prozac daily for mental health management.  She is going through some increase stressors as she works through therapy, but otherwise feels that her mental health is doing quite well.      Review of Systems  Constitutional, HEENT, cardiovascular, pulmonary, gi and gu systems are negative, except as otherwise noted.      Objective    /70 (BP Location: Left arm, Patient Position: Sitting, Cuff Size: Adult Regular)   Pulse 80   Temp 98.8  F (37.1  C) (Tympanic)   Resp 11   Ht 1.549 m (5' 1\")   Wt 75.8 kg (167 lb 3.2 oz)   LMP 04/12/2025 (Exact Date)   SpO2 95%   BMI 31.59 kg/m    Body mass index is 31.59 kg/m .  Physical Exam   GENERAL: alert and no distress  EYES: Eyes grossly normal to inspection, PERRL and conjunctivae and sclerae normal  HENT: ear canals and TM's normal, nose and mouth without ulcers or lesions  NECK: no adenopathy, no asymmetry, masses, or scars  RESP: lungs clear to auscultation - no rales, rhonchi or wheezes  CV: regular rate and rhythm, normal S1 S2, no S3 or S4, no murmur, click or rub, no peripheral edema  ABDOMEN: soft, nontender, no hepatosplenomegaly, no masses and bowel sounds normal  MS: no gross musculoskeletal defects noted, no " edema    Results for orders placed or performed in visit on 04/29/25 (from the past 24 hours)   UA Microscopic with Reflex to Culture   Result Value Ref Range    Bacteria Urine Few (A) None Seen /HPF    RBC Urine  (A) 0-2 /HPF /HPF    WBC Urine >100 (A) 0-5 /HPF /HPF    Squamous Epithelials Urine Moderate (A) None Seen /LPF    WBC Clumps Urine Present (A) None Seen /HPF         Brianna Pillai DNP FNP-C  Family Nurse Practitioner - Same Day Provider  [facility]    Signed Electronically by: NANDO Mckeon CNP

## 2025-05-01 LAB — BACTERIA UR CULT: NORMAL

## 2025-05-03 ASSESSMENT — SLEEP AND FATIGUE QUESTIONNAIRES
HOW LIKELY ARE YOU TO NOD OFF OR FALL ASLEEP WHILE WATCHING TV: SLIGHT CHANCE OF DOZING
HOW LIKELY ARE YOU TO NOD OFF OR FALL ASLEEP WHILE SITTING AND TALKING TO SOMEONE: WOULD NEVER DOZE
HOW LIKELY ARE YOU TO NOD OFF OR FALL ASLEEP WHILE SITTING AND READING: SLIGHT CHANCE OF DOZING
HOW LIKELY ARE YOU TO NOD OFF OR FALL ASLEEP IN A CAR, WHILE STOPPED FOR A FEW MINUTES IN TRAFFIC: WOULD NEVER DOZE
HOW LIKELY ARE YOU TO NOD OFF OR FALL ASLEEP WHILE SITTING QUIETLY AFTER LUNCH WITHOUT ALCOHOL: WOULD NEVER DOZE
HOW LIKELY ARE YOU TO NOD OFF OR FALL ASLEEP WHEN YOU ARE A PASSENGER IN A CAR FOR AN HOUR WITHOUT A BREAK: SLIGHT CHANCE OF DOZING
HOW LIKELY ARE YOU TO NOD OFF OR FALL ASLEEP WHILE SITTING INACTIVE IN A PUBLIC PLACE: SLIGHT CHANCE OF DOZING
HOW LIKELY ARE YOU TO NOD OFF OR FALL ASLEEP WHILE LYING DOWN TO REST IN THE AFTERNOON WHEN CIRCUMSTANCES PERMIT: MODERATE CHANCE OF DOZING

## 2025-05-06 ENCOUNTER — VIRTUAL VISIT (OUTPATIENT)
Dept: SLEEP MEDICINE | Facility: CLINIC | Age: 25
End: 2025-05-06
Payer: COMMERCIAL

## 2025-05-06 VITALS
WEIGHT: 167 LBS | HEIGHT: 61 IN | BODY MASS INDEX: 31.53 KG/M2 | SYSTOLIC BLOOD PRESSURE: 108 MMHG | DIASTOLIC BLOOD PRESSURE: 70 MMHG

## 2025-05-06 DIAGNOSIS — G47.33 OSA (OBSTRUCTIVE SLEEP APNEA): Primary | ICD-10-CM

## 2025-05-06 PROCEDURE — 3078F DIAST BP <80 MM HG: CPT | Performed by: PHYSICIAN ASSISTANT

## 2025-05-06 PROCEDURE — 1126F AMNT PAIN NOTED NONE PRSNT: CPT | Performed by: PHYSICIAN ASSISTANT

## 2025-05-06 PROCEDURE — 98006 SYNCH AUDIO-VIDEO EST MOD 30: CPT | Performed by: PHYSICIAN ASSISTANT

## 2025-05-06 PROCEDURE — 3074F SYST BP LT 130 MM HG: CPT | Performed by: PHYSICIAN ASSISTANT

## 2025-05-06 ASSESSMENT — PAIN SCALES - GENERAL: PAINLEVEL_OUTOF10: NO PAIN (0)

## 2025-05-06 NOTE — PATIENT INSTRUCTIONS
Your Body mass index is 31.55 kg/m .  Weight management is a personal decision.  If you are interested in exploring weight loss strategies, the following discussion covers the approaches that may be successful. Body mass index (BMI) is one way to tell whether you are at a healthy weight, overweight, or obese. It measures your weight in relation to your height.  A BMI of 18.5 to 24.9 is in the healthy range. A person with a BMI of 25 to 29.9 is considered overweight, and someone with a BMI of 30 or greater is considered obese. More than two-thirds of American adults are considered overweight or obese.  Being overweight or obese increases the risk for further weight gain. Excess weight may lead to heart disease and diabetes.  Creating and following plans for healthy eating and physical activity may help you improve your health.  Weight control is part of healthy lifestyle and includes exercise, emotional health, and healthy eating habits. Careful eating habits lifelong are the mainstay of weight control. Though there are significant health benefits from weight loss, long-term weight loss with diet alone may be very difficult to achieve- studies show long-term success with dietary management in less than 10% of people. Attaining a healthy weight may be especially difficult to achieve in those with severe obesity. In some cases, medications, devices and surgical management might be considered.  What can you do?  If you are overweight or obese and are interested in methods for weight loss, you should discuss this with your provider.   Consider reducing daily calorie intake by 500 calories.   Keep a food journal.   Avoiding skipping meals, consider cutting portions instead.    Diet combined with exercise helps maintain muscle while optimizing fat loss. Strength training is particularly important for building and maintaining muscle mass. Exercise helps reduce stress, increase energy, and improves fitness. Increasing  exercise without diet control, however, may not burn enough calories to loose weight.     Start walking three days a week 10-20 minutes at a time  Work towards walking thirty minutes five days a week   Eventually, increase the speed of your walking for 1-2 minutes at time    In addition, we recommend that you review healthy lifestyles and methods for weight loss available through the National Institutes of Health patient information sites:  http://win.niddk.nih.gov/publications/index.htm    And look into health and wellness programs that may be available through your health insurance provider, employer, local community center, or tommy club.

## 2025-05-06 NOTE — NURSING NOTE
Current patient location: [address] [place] [address]    Is the patient currently in the state of [place]? YES    Visit mode: VIDEO    If the visit is dropped, the patient can be reconnected by:VIDEO VISIT: Text to cell phone:   Telephone Information:   Mobile [phone]   Mobile Not on file.       Will anyone else be joining the visit? NO  (If patient encounters technical issues they should call [phone]    Are changes needed to the allergy or medication list? No    Are refills needed on medications prescribed by this physician? NO    Rooming Documentation:  Questionnaire(s) completed    Reason for visit: TERE Davis VVF
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
## 2025-07-01 ENCOUNTER — OFFICE VISIT (OUTPATIENT)
Dept: PLASTIC SURGERY | Facility: CLINIC | Age: 25
End: 2025-07-01
Attending: PLASTIC SURGERY
Payer: COMMERCIAL

## 2025-07-01 VITALS
HEART RATE: 75 BPM | BODY MASS INDEX: 32.47 KG/M2 | HEIGHT: 61 IN | WEIGHT: 172 LBS | OXYGEN SATURATION: 97 % | DIASTOLIC BLOOD PRESSURE: 84 MMHG | RESPIRATION RATE: 16 BRPM | TEMPERATURE: 98 F | SYSTOLIC BLOOD PRESSURE: 127 MMHG

## 2025-07-01 DIAGNOSIS — N62 HYPERTROPHY OF BREAST: Primary | ICD-10-CM

## 2025-07-01 DIAGNOSIS — Z98.890 S/P BILATERAL BREAST REDUCTION: ICD-10-CM

## 2025-07-01 PROCEDURE — G0463 HOSPITAL OUTPT CLINIC VISIT: HCPCS | Performed by: PLASTIC SURGERY

## 2025-07-01 RX ORDER — CEFAZOLIN SODIUM 2 G/50ML
2 SOLUTION INTRAVENOUS
OUTPATIENT
Start: 2025-07-01

## 2025-07-01 RX ORDER — CEFAZOLIN SODIUM 2 G/50ML
2 SOLUTION INTRAVENOUS SEE ADMIN INSTRUCTIONS
OUTPATIENT
Start: 2025-07-01

## 2025-07-01 ASSESSMENT — PAIN SCALES - GENERAL: PAINLEVEL_OUTOF10: NO PAIN (0)

## 2025-07-01 NOTE — NURSING NOTE
"Oncology Rooming Note    July 1, 2025 9:55 AM   Mary Peter is a 25 year old female who presents for:    Chief Complaint   Patient presents with    Oncology Clinic Visit     Breast reduction 7/25/2024     Initial Vitals: /84   Pulse 75   Temp 98  F (36.7  C) (Oral)   Resp 16   Ht 1.549 m (5' 1\")   Wt 78 kg (172 lb)   SpO2 97%   BMI 32.50 kg/m   Estimated body mass index is 32.5 kg/m  as calculated from the following:    Height as of this encounter: 1.549 m (5' 1\").    Weight as of this encounter: 78 kg (172 lb). Body surface area is 1.83 meters squared.  No Pain (0) Comment: Data Unavailable   No LMP recorded. (Menstrual status: IUD).  Allergies reviewed: Yes  Medications reviewed: Yes    Medications: Medication refills not needed today.  Pharmacy name entered into [employer]:    [id number] [place], [place] - [address]/[id number] [place], [place] - [address]    Frailty Screening:   Is the patient here for a new oncology consult visit in cancer care? 2. No    PHQ9:  Did this patient require a PHQ9?: No      Clinical concerns:        Nelli Feliz              "

## 2025-07-01 NOTE — PROGRESS NOTES
PRESENTING COMPLAINT:  Post-operative visit s/p bilateral breast reduction on 7/25/2024     HISTORY OF PRESENTING COMPLAINT: The patient is here for post-operative visit.  The patient is being seen in the presence of my nurse.      The patient is visiting now a year after her surgery because she is concerned about the position of her nipples and the difficulty in wearing bras and swimsuits in which the areola becomes visible.     On exam: Vital signs stable afebrile.  She is fully healed.  She has pseudoptosis.  The nipple areolar complexes are in the appropriate position.  The lower pole of skin and breast tissue is stretched out.  This gives the illusion of the nipple areolar complexes being high.     ASSESSMENT AND PLAN:  Based upon the above findings, the patient is here for post-operative visit.      My plan will be to take her back to the OR and remove the lower pole of tissue and skin and normalized the proportions of her breast.  We will do this through insurance if possible.  If not we will come up with some kind of revisional plan.     All questions were answered.  The patient was happy with the visit.

## 2025-07-01 NOTE — LETTER
7/1/2025      Mary Peter  [address]  [place] [address]      Dear Colleague,    Thank you for referring your patient, Mary Peter, to the [facility]. Please see a copy of my visit note below.    PRESENTING COMPLAINT:  Post-operative visit s/p bilateral breast reduction on 7/25/2024     HISTORY OF PRESENTING COMPLAINT: The patient is here for post-operative visit.  The patient is being seen in the presence of my nurse.      The patient is visiting now a year after her surgery because she is concerned about the position of her nipples and the difficulty in wearing bras and swimsuits in which the areola becomes visible.     On exam: Vital signs stable afebrile.  She is fully healed.  She has pseudoptosis.  The nipple areolar complexes are in the appropriate position.  The lower pole of skin and breast tissue is stretched out.  This gives the illusion of the nipple areolar complexes being high.     ASSESSMENT AND PLAN:  Based upon the above findings, the patient is here for post-operative visit.      My plan will be to take her back to the OR and remove the lower pole of tissue and skin and normalized the proportions of her breast.  We will do this through insurance if possible.  If not we will come up with some kind of revisional plan.     All questions were answered.  The patient was happy with the visit.    Again, thank you for allowing me to participate in the care of your patient.        Sincerely,        ELIAN Bourne MD    Electronically signed

## 2025-07-03 ENCOUNTER — TELEPHONE (OUTPATIENT)
Dept: PLASTIC SURGERY | Facility: CLINIC | Age: 25
End: 2025-07-03
Payer: COMMERCIAL

## 2025-07-03 ENCOUNTER — HOSPITAL ENCOUNTER (OUTPATIENT)
Facility: CLINIC | Age: 25
End: 2025-07-03
Attending: PLASTIC SURGERY | Admitting: PLASTIC SURGERY
Payer: COMMERCIAL

## 2025-07-03 PROBLEM — Z98.890 S/P BILATERAL BREAST REDUCTION: Status: ACTIVE | Noted: 2025-07-01

## 2025-07-03 NOTE — TELEPHONE ENCOUNTER
Called to schedule surgery with: Dr. Bourne    Spoke with: Patient's Mother Lianna     Date of Surgery: 7/31/2025    Estimated Arrival time Discussed with Patient:  No    Location of surgery: [facility]    Pre-operative H&P: clinic PAC visit 7/17/2025 at 10:00 AM     Pre-surgical Consult: patient declined - clinic to follow up with patient as needed    Additional Appointments: N/A    Post-operative Appointment w/ODIN or Surgeon: Dr. Bourne 8/13/2025 at 10:00 AM    Discussed with patient PAC RN will provide arrival time and instructions for surgery at the time of the appointment: [[place] locations only]: Yes    Standard Surgery Packet: Yes 7/3/2025 via [employer] Message      Additional Comments: N/A      All patients questions were answered and was instructed to contact the clinic with any questions or concerns.     Elizabet Jordan on 7/3/2025 at 9:43 AM

## 2025-07-13 NOTE — TELEPHONE ENCOUNTER
FUTURE VISIT INFORMATION      SURGERY INFORMATION:  Date: 25   [place] [facility] [place]  Surgeon:  Naya Bourne MD   Anesthesia Type:  General   Procedure: REVISION OF BREAST REDUCTION  Consult: 25    RECORDS REQUESTED FROM:       Primary Care Provider: [facility]     Pertinent Medical History: DELLA     Most recent EKG+ Tracin23 -     Most recent Sleep Study: 10/6/24    Action 2025  12:19 PM MMT   Action Taken  Request faxed to  for EKG tracings.     Update July 15, 2025 9:41 AM MMT :  EKG tracings received from  and sent to [facility].

## 2025-07-16 ENCOUNTER — PATIENT OUTREACH (OUTPATIENT)
Dept: PLASTIC SURGERY | Facility: CLINIC | Age: 25
End: 2025-07-16
Payer: COMMERCIAL

## 2025-07-16 NOTE — TELEPHONE ENCOUNTER
Left message for patient to return call to [phone].    Need to talk to her about surgery denial and need to push the date out, until we can talk to Dr Bourne about his plan for this revision.

## 2025-07-17 ENCOUNTER — OFFICE VISIT (OUTPATIENT)
Dept: SURGERY | Facility: CLINIC | Age: 25
End: 2025-07-17
Payer: COMMERCIAL

## 2025-07-17 ENCOUNTER — PRE VISIT (OUTPATIENT)
Dept: SURGERY | Facility: CLINIC | Age: 25
End: 2025-07-17

## 2025-07-17 ENCOUNTER — LAB (OUTPATIENT)
Dept: LAB | Facility: CLINIC | Age: 25
End: 2025-07-17
Payer: COMMERCIAL

## 2025-07-17 ENCOUNTER — ANESTHESIA EVENT (OUTPATIENT)
Dept: SURGERY | Facility: CLINIC | Age: 25
End: 2025-07-17

## 2025-07-17 VITALS
HEIGHT: 61 IN | WEIGHT: 172 LBS | RESPIRATION RATE: 16 BRPM | DIASTOLIC BLOOD PRESSURE: 78 MMHG | OXYGEN SATURATION: 98 % | SYSTOLIC BLOOD PRESSURE: 114 MMHG | TEMPERATURE: 97.7 F | BODY MASS INDEX: 32.47 KG/M2 | HEART RATE: 59 BPM

## 2025-07-17 DIAGNOSIS — Z01.818 PREOP EXAMINATION: Primary | ICD-10-CM

## 2025-07-17 DIAGNOSIS — Z98.890 S/P BILATERAL BREAST REDUCTION: ICD-10-CM

## 2025-07-17 DIAGNOSIS — K21.9 GASTROESOPHAGEAL REFLUX DISEASE, UNSPECIFIED WHETHER ESOPHAGITIS PRESENT: ICD-10-CM

## 2025-07-17 DIAGNOSIS — Z01.818 PREOP EXAMINATION: ICD-10-CM

## 2025-07-17 LAB
ANION GAP SERPL CALCULATED.3IONS-SCNC: 11 MMOL/L (ref 7–15)
BUN SERPL-MCNC: 9.6 MG/DL (ref 6–20)
CALCIUM SERPL-MCNC: 9.7 MG/DL (ref 8.8–10.4)
CHLORIDE SERPL-SCNC: 104 MMOL/L (ref 98–107)
CREAT SERPL-MCNC: 0.85 MG/DL (ref 0.51–0.95)
EGFRCR SERPLBLD CKD-EPI 2021: >90 ML/MIN/1.73M2
ERYTHROCYTE [DISTWIDTH] IN BLOOD BY AUTOMATED COUNT: 13.8 % (ref 10–15)
GLUCOSE SERPL-MCNC: 87 MG/DL (ref 70–99)
HCO3 SERPL-SCNC: 25 MMOL/L (ref 22–29)
HCT VFR BLD AUTO: 38.6 % (ref 35–47)
HGB BLD-MCNC: 12.6 G/DL (ref 11.7–15.7)
MCH RBC QN AUTO: 28 PG (ref 26.5–33)
MCHC RBC AUTO-ENTMCNC: 32.6 G/DL (ref 31.5–36.5)
MCV RBC AUTO: 86 FL (ref 78–100)
PLATELET # BLD AUTO: 361 10E3/UL (ref 150–450)
POTASSIUM SERPL-SCNC: 4.5 MMOL/L (ref 3.4–5.3)
RBC # BLD AUTO: 4.5 10E6/UL (ref 3.8–5.2)
SODIUM SERPL-SCNC: 140 MMOL/L (ref 135–145)
WBC # BLD AUTO: 8.3 10E3/UL (ref 4–11)

## 2025-07-17 RX ORDER — OLANZAPINE 2.5 MG/1
2.5 TABLET, FILM COATED ORAL PRN
COMMUNITY
Start: 2024-11-19

## 2025-07-17 RX ORDER — FAMOTIDINE 20 MG/1
20 TABLET, FILM COATED ORAL 2 TIMES DAILY
Qty: 60 TABLET | Refills: 0 | Status: SHIPPED | OUTPATIENT
Start: 2025-07-17

## 2025-07-17 ASSESSMENT — PAIN SCALES - GENERAL: PAINLEVEL_OUTOF10: NO PAIN (0)

## 2025-07-17 ASSESSMENT — PATIENT HEALTH QUESTIONNAIRE - PHQ9: SUM OF ALL RESPONSES TO PHQ QUESTIONS 1-9: 22

## 2025-07-17 ASSESSMENT — ENCOUNTER SYMPTOMS: SEIZURES: 0

## 2025-07-17 NOTE — H&P
Pre-Operative H & P     CC:  Preoperative exam to assess for increased cardiopulmonary risk while undergoing surgery and anesthesia.    Date of Encounter: 7/17/2025  Primary Care Physician:  No Ref-Primary, Physician     Reason for visit:   Encounter Diagnoses   Name Primary?    Preop examination Yes    S/P bilateral breast reduction     Gastroesophageal reflux disease, unspecified whether esophagitis present        HPI  Mary Peter is a 25 year old female who presents for pre-operative H & P in preparation for  Procedure Information       Case: [id number] Date/Time: 07/31/25 1315    Procedure: Revision of breast reduction (Bilateral: Breast)    Anesthesia type: General    Diagnosis:       Hypertrophy of breast [N62]      S/P bilateral breast reduction [Z98.890]    Pre-op diagnosis:       Hypertrophy of breast [N62]      S/P bilateral breast reduction [Z98.890]    Location:  OR  /  OR    Providers: ELIAN Bourne MD            Mary Peter is a 25 year old female with obesity, sleep apnea, obesity, migraines, chronic back pain, GERD, conversion disorder, psychological fainting spells, depression, anxiety, personality disorder, insomnia, ADHD and PTSD that is s/p bilateral breast reduction surgery on 7/25/24 for hypertrophy.  She is concerned about the position of her nipples now and the difficulty in wearing bras and swimsuits in which the areola becomes visible.  She has consulted again with Dr. Bourne and the above listed procedure has been recommended.        History is obtained from the patient, her mother, and chart review  - support dog present    Hx of abnormal bleeding or anti-platelet use: none    Menstrual history: No LMP recorded (lmp unknown). (Menstrual status: IUD).:      Past Medical History  Past Medical History:   Diagnosis Date    ADHD (attention deficit hyperactivity disorder)     Anxiety     Conversion disorder     Depression     GERD (gastroesophageal reflux disease)      Migraine     Obesity     Personality disorder (H)     Psychogenic disorder     psychogenic fainting secondary to PTSD and conversion disorder    Psychogenic nonepileptic seizure     PTSD (post-traumatic stress disorder)     Sleep apnea        Past Surgical History  Past Surgical History:   Procedure Laterality Date    ADENOIDECTOMY      HC ARTHROSCOPIC REMOVAL GANGLION CYST-WRIST/HAND      x2    MAMMOPLASTY REDUCTION BILATERAL Bilateral 7/25/2024    Procedure: MAMMOPLASTY, REDUCTION, BILATERAL;  Surgeon: ELIAN Bourne MD;  Location:  OR       Prior to Admission Medications  Current Outpatient Medications   Medication Sig Dispense Refill    famotidine (PEPCID) 20 MG tablet Take 1 tablet (20 mg) by mouth 2 times daily. 60 tablet 0    FLUoxetine (PROZAC) 40 MG capsule Take 40 mg by mouth daily.      hydrOXYzine HCl (ATARAX) 25 MG tablet Take 1 tablet (25 mg) by mouth 3 times daily as needed for anxiety (Patient not taking: Reported on 7/17/2025) 25 tablet 0    OLANZapine (ZYPREXA) 2.5 MG tablet Take 2.5 mg by mouth as needed. (Patient not taking: Reported on 7/17/2025)      QUEtiapine (SEROQUEL) 25 MG tablet Take 25 mg by mouth as needed (Anxiety). (Patient not taking: Reported on 7/17/2025)         Allergies  No Known Allergies    Social History  Social History     Socioeconomic History    Marital status: Single     Spouse name: Not on file    Number of children: 0    Years of education: Not on file    Highest education level: Not on file   Occupational History    Occupation: unemployed   Tobacco Use    Smoking status: Never     Passive exposure: Never    Smokeless tobacco: Never   Vaping Use    Vaping status: Never Used   Substance and Sexual Activity    Alcohol use: Not Currently    Drug use: Yes     Types: Marijuana     Comment: smoke marijuana daily    Sexual activity: Not on file   Other Topics Concern    Not on file   Social History Narrative    Not on file     Social Drivers of Health     Financial  Resource Strain: Low Risk  (3/8/2024)    Financial Resource Strain     Within the past 12 months, have you or your family members you live with been unable to get utilities (heat, electricity) when it was really needed?: No   Food Insecurity: Low Risk  (3/8/2024)    Food Insecurity     Within the past 12 months, did you worry that your food would run out before you got money to buy more?: No     Within the past 12 months, did the food you bought just not last and you didn t have money to get more?: No   Transportation Needs: Low Risk  (3/8/2024)    Transportation Needs     Within the past 12 months, has lack of transportation kept you from medical appointments, getting your medicines, non-medical meetings or appointments, work, or from getting things that you need?: No   Physical Activity: Unknown (3/8/2024)    Exercise Vital Sign     Days of Exercise per Week: 0 days     Minutes of Exercise per Session: Not on file   Stress: Stress Concern Present (3/8/2024)    [employer] - Occupational Stress Questionnaire     Feeling of Stress : Very much   Social Connections: Unknown (3/8/2024)    Social Connection and Isolation Panel [NHANES]     Frequency of Communication with Friends and Family: Not on file     Frequency of Social Gatherings with Friends and Family: More than three times a week     Attends Worship Services: Not on file     Active Member of Clubs or Organizations: Not on file     Attends Club or Organization Meetings: Not on file     Marital Status: Not on file   Interpersonal Safety: Unknown (4/14/2024)    Received from [facility]    Humiliation, Afraid, Rape, and Kick questionnaire     Fear of Current or Ex-Partner: Not on file     Emotionally Abused: Not on file     Physically Abused: No     Sexually Abused: No   Housing Stability: Low Risk  (3/8/2024)    Housing Stability     Do you have housing? : Yes     Are you worried about losing your housing?: No       Family  "History  Family History   Problem Relation Age of Onset    Hypertension Mother     Cerebrovascular Disease Father     Melanoma No family hx of     Skin Cancer No family hx of     Anesthesia Reaction No family hx of     Venous thrombosis No family hx of        Review of Systems  The complete review of systems is negative other than noted in the HPI or here.   Anesthesia Evaluation   Pt has had prior anesthetic.     History of anesthetic complications  - .  post-op hypertension x 1.    ROS/MED HX  ENT/Pulmonary:     (+) sleep apnea, uses CPAP,   DELLA risk factors, snores loudly,  obese,  observed stopped breathing,                           (-) recent URI   Neurologic: Comment: Psychological fainting spells secondary to PTSD and conversion disorder    (+)      migraines,                       (-) no seizures   Cardiovascular:     (+)  - -   -  - -             fainting (syncope) (daily fainting spells, \"multiple times per day.\").                    Previous cardiac testing   Echo: Date: Results:    Stress Test:  Date: Results:    ECG Reviewed:  Date: 4/18/23 Results:  Sinus rhythm  Cath:  Date: Results:      METS/Exercise Tolerance: >4 METS Comment: Hiking, walking and going to the gym occasionally.  Denies exertional dyspnea or angina.    Hematologic:     (+)      anemia,          Musculoskeletal: Comment: Chronic, intermittent back pain      GI/Hepatic: Comment: Frequent N/V    (+) GERD, Symptomatic,                  Renal/Genitourinary:  - neg Renal ROS     Endo:     (+)               Obesity,       Psychiatric/Substance Use:     (+) psychiatric history anxiety, depression and other (comment) (insomnia, ADHD, PTSD, personality disorder, conversion disorder)   Recreational drug usage: Cannabis.    Infectious Disease:  - neg infectious disease ROS     Malignancy:  - neg malignancy ROS     Other: Comment: Left eye vision \"blind spot\" - ophthalmology following, neurology consult pending           /78 (BP Location: " "Right arm, Patient Position: Sitting, Cuff Size: Adult Regular)   Pulse 59   Temp 97.7  F (36.5  C) (Oral)   Resp 16   Ht 1.549 m (5' 1\")   Wt 78 kg (172 lb)   LMP  (LMP Unknown)   SpO2 98%   Breastfeeding No   BMI 32.50 kg/m      Physical Exam   Constitutional: Awake, alert, cooperative, no apparent distress, and appears stated age.  Eyes: Pupils equal, round and reactive to light, extra ocular muscles intact, sclera clear, conjunctiva normal.  HENT: Normocephalic, oral pharynx with moist mucus membranes, good dentition. No goiter appreciated.   Respiratory: Clear to auscultation bilaterally, no crackles or wheezing.  Cardiovascular: Regular rate and rhythm, normal S1 and S2, and no murmur noted.  Carotids +2, no bruits. No edema. Palpable pulses to radial  DP and PT arteries.   GI: Normal bowel sounds, soft, non-distended, non-tender, no masses palpated, no hepatosplenomegaly.    Lymph/Hematologic: No cervical lymphadenopathy and no supraclavicular lymphadenopathy.  Genitourinary:  deferred  Skin: Warm and dry.  No rashes at anticipated surgical site.   Musculoskeletal: Full ROM of neck. There is no redness, warmth, or swelling of the exposed joints. Gross motor strength is normal.    Neurologic: Awake, alert, oriented to name, place and time. Cranial nerves II-XII are grossly intact.   Neuropsychiatric: Calm, cooperative. pleasant, childlike affect.     Prior Labs/Diagnostic Studies   All labs and imaging pertinent to the visit personally reviewed     EKG/ stress test - if available please see in ROS above           The patient's records and results pertinent to the visit personally reviewed by this provider.     Outside records reviewed from: Care Everywhere    LAB/DIAGNOSTIC STUDIES TODAY:    Component      Latest Ref Rng 7/17/2025  10:59 AM   Sodium      135 - 145 mmol/L 140    Potassium      3.4 - 5.3 mmol/L 4.5    Chloride      98 - 107 mmol/L 104    Carbon Dioxide (CO2)      22 - 29 mmol/L 25  "   Anion Gap      7 - 15 mmol/L 11    Urea Nitrogen      6.0 - 20.0 mg/dL 9.6    Creatinine      0.51 - 0.95 mg/dL 0.85    GFR Estimate      >60 mL/min/1.73m2 >90    Calcium      8.8 - 10.4 mg/dL 9.7    Glucose      70 - 99 mg/dL 87    WBC      4.0 - 11.0 10e3/uL 8.3    RBC Count      3.80 - 5.20 10e6/uL 4.50    Hemoglobin      11.7 - 15.7 g/dL 12.6    Hematocrit      35.0 - 47.0 % 38.6    MCV      78 - 100 fL 86    MCH      26.5 - 33.0 pg 28.0    MCHC      31.5 - 36.5 g/dL 32.6    RDW      10.0 - 15.0 % 13.8    Platelet Count      150 - 450 10e3/uL 361          Assessment    Mary Peter is a 25 year old female seen as a PAC referral for risk assessment and optimization for anesthesia.    Plan/Recommendations  Pt will be optimized for the proposed procedure.  See below for details on the assessment, risk, and preoperative recommendations    NEUROLOGY  - No history of TIA, CVA or seizure    - see note in psych section below regarding syncope history   -Post Op delirium risk factors:  No risk identified    ENT  - No current airway concerns.  Will need to be reassessed day of surgery.  Mallampati: I  TM: > 3    CARDIAC  - No history of CAD, Hypertension, and Afib  - see note in psych section below regarding syncope history       - METS (Metabolic Equivalents)  Patient performs 4 or more METS exercise without symptoms             Total Score: 0      RCRI-Very low risk: Class 1 0.4% complication rate             Total Score: 0        PULMONARY  - Obstructive Sleep Apnea  DELLA with home CPAP.      - Denies asthma or inhaler use  - Tobacco History    History   Smoking Status    Never   Smokeless Tobacco    Never       GI  - patient used to take omeprazole for GERD, but stopped knowing that she shouldn't take it long term.  Continues to have GERD symptoms.  30 day supply ordered of BID pepcid per her mother's request.  Will follow up with primary care provider for refills.   PONV High Risk  Total Score: 3           1 AN  "PONV: Pt is Female    1 AN PONV: Patient is not a current smoker    1 AN PONV: Intended Post Op Opioids          ENDOCRINE    - BMI: Estimated body mass index is 32.5 kg/m  as calculated from the following:    Height as of this encounter: 1.549 m (5' 1\").    Weight as of this encounter: 78 kg (172 lb).  Obesity (BMI >30)  - No history of Diabetes Mellitus    HEME  VTE Low Risk 0.26%             Total Score: 0      - No history of abnormal bleeding or antiplatelet use.        MSK  Patient is NOT Frail             Total Score: 2    Frailty: Increased exhaustion    Frailty: Overall lack of energy      - chronic back pain.  Consider cautious positioning.     Other  - s/p breast reduction with undesired appearance.  Surgical plan as above.    PSYCH  - continue prozac and seroquel without interruption.  - hold hydroxyzine DOS  -PHQ score elevated at 22 today.  Patient reports this is stable and denies any active suicidal/self-harm thoughts or plans.  She notes that thoughts are always \"passive.\"  She is following closely with psychiatry/psychology and knows who to contact with any worsening symptoms.     - patient with well documented conversion disorder with \"convulsions\" and psychological fainting spells secondary to prior trauma.  She has been fully worked up at outside facilities including EEG to rule out seizures.  She and her mother note that anechocardiogram done at [facility] approximately 4-5 years ago was negative for any cardiac abnormality.  Multiple attempts were made to obtain record of this echo last year when she was here at [facility], but were unsuccessful. She notes that she faints multiple times per day and the only known cause is her anxiety and PTSD.     Due to this history, we have recommended that her case be changed from [facility] to [facility].  I notified the surgical team of this yesterday when prepping for this patient and they have already moved her case to .  Patient and mother were notified by the " surgical team.          Different anesthesia methods/types have been discussed with the patient, but they are aware that the final plan will be decided by the assigned anesthesia provider on the date of service.      The patient is optimized for their procedure. AVS with information on surgery time/arrival time, meds and NPO status given by nursing staff. No further diagnostic testing indicated.        30 minutes were spent on the date of the encounter performing chart review, history and exam, documentation and/or discussion with other providers about the issues documented above.    NANDO Mendoza CNP  Preoperative Assessment Center  [facility]  Clinic and Surgery Center  Phone: [phone]  Fax: [phone]

## 2025-07-17 NOTE — PATIENT INSTRUCTIONS
Preparing for Your Surgery      Name:  Mary Peter   MRN:  [id number]   :  2000   Today's Date:  2025     The [place] [employer] [address] Construction Project                                Timeline 2025 -2025    This project will affect travel to the [facility] and [facility], as well as the [facility].    Please check the [facility] [address] project website for the most up to date information and give yourself additional time to reach your destination.    Arriving for surgery:  Surgery date:  25  Arrival time:  11.15AM    Please come to:     Please come to:       [facility]    [address] [place]  [address]     The [facility] [place] Patient/Visitor Ramp is at [address]. Patients and visitors who self-park will receive the reduced hospital parking rate. If the Patient /Visitor Ramp is full, please follow the signs to the [employer] car park located at the [facility] hospital entrance.      [employer] parking is available (24 hours/ 7 days a week)      Discounted parking pass options are available for patients and visitors. They can be purchased at the [employer] desk at the [facility] entrance.     -    Stop at the security desk and they will direct surgery patients to the Surgery Check in and Family Lounge. [phone]        - If you need directions, a wheelchair or an escort please stop at the Information/security desk in the lobby.     What can I eat or drink?  -  You may eat and drink normally up to 8 hours prior to arrival time. (Until 3.15AM)  -  You may have clear liquids until 2 hours prior to arrival time. (Until 9.15AM)    Examples of clear liquids:  Water  Clear broth  Juices (apple, white grape, white cranberry  and cider) without pulp  Noncarbonated, powder based beverages  (lemonade and Ashu-Aid)  Sodas  (Manuel, 7-Up, ginger ale and seltzer)  Coffee or tea (without milk or cream)  Gatorade    -  No Alcohol or cannabis products for at least 24 hours before surgery.     Which medicines can I take?    Hold Aspirin for 7 days before surgery.   Hold Multivitamins for 7 days before surgery.  Hold Supplements for 7 days before surgery.  Hold Ibuprofen (Advil, Motrin) for 1 day(s) before surgery--unless otherwise directed by surgeon.  Hold Naproxen (Aleve) for 4 days before surgery.    -  DO NOT take these medications the day of surgery:  Hydroxyzine (Atarax)    -  PLEASE TAKE these medications the day of surgery:  Fluoxetine (Prozac)  Famotidine (Pepcid)    How do I prepare myself?  - Please take 2 showers (one the night prior to surgery and one the morning of surgery) using Scrubcare or Hibiclens soap.    Use this soap only from the neck to your toes. Avoid genital area      Leave the soap on your skin for one minute--then rinse thoroughly.      You may use your own shampoo and conditioner. No other hair products.   - Please remove all jewelry and body piercings.  - No lotions, deodorants or fragrance.  - No makeup or fingernail polish.   - Bring your ID and insurance card.    -For patients being admitted to the [facility]  Family members are to take the patient belongings with them and place them in the lockers provided in the Family Lounge.  Please limit the items you bring to 1 bag as the lockers are small.      -If you use a CPAP machine, please bring the CPAP machine, tubing, and mask to hospital.    -If you have a Deep Brain Stimulator, Spinal Cord Stimulator, or any Neuro Stimulator device---you must bring the remote control to the hospital.      ALL PATIENTS GOING HOME THE SAME DAY OF SURGERY ARE REQUIRED TO HAVE A RESPONSIBLE ADULT TO DRIVE AND BE IN ATTENDANCE WITH THEM FOR 24 HOURS FOLLOWING SURGERY.    Covid testing policy as of 12/06/2022  Your surgeon will notify and schedule you for a COVID test if  one is needed before surgery--please direct any questions or COVID symptoms to your surgeon      Questions or Concerns:    - For any questions regarding the day of surgery or your hospital stay, please contact the Pre Admission Nursing Office at [phone].       - If you have health changes between today and your surgery, please call your surgeon.       - For questions after surgery, please call your surgeons office.           Current Visitor Guidelines    2 adult visitors for adult patients in the pre op area    If additional visitors come (beyond a patient care attendant or a group home caregiver), the additional visitors will be asked to wait in the main lobby of the hospital    Visiting hours: 8 a.m. to 8:30 p.m.    Patients confirmed or suspected to have symptoms of COVID 19 or flu:     No visitors allowed for adult patients.   Children (under age 18) can have 1 named visitor.     People who are sick or showing symptoms of COVID 19 or flu:    Are not allowed to visit patients--we can only make exceptions in special situations.       Please follow these guidelines for your visit:          Please maintain social distance          Masking is optional--however at times you may be asked to wear a mask for the safety of yourself and others     Clean your hands with alcohol hand . Do this when you arrive at and leave the building and patient room,    And again after you touch your mask or anything in the room.     Go directly to and from the room you are visiting.     Stay in the patient s room during your visit. Limit going to other places in the hospital as much as possible     Leave bags and jackets at home or in the car.     For everyone s health, please don t come and go during your visit. That includes for smoking   during your visit.

## 2025-07-21 NOTE — TELEPHONE ENCOUNTER
Mary's mom called me back. She wants to get the quote for [facility], but she does want to cancel the case for now. She may want to pay out of pocket in the future for this case. She wants to speak with the ASC and fight to be able to get the surgery scheduled there. She wants to work with 's neurologist to get documentation that it is safe for her to have surgery at an ASC (for cost reasons).     Mom will be in touch with us if/when they want to reschedule the case.

## 2025-07-21 NOTE — TELEPHONE ENCOUNTER
Per our leadership, this is a cosmetic case.     If they want to proceed with the case once quoted, the date can stay. If they want to postpone, they need to let us know.     I LM for the pt this morning letting her know she will get a quote.

## 2025-07-29 ENCOUNTER — MYC MEDICAL ADVICE (OUTPATIENT)
Dept: SURGERY | Facility: CLINIC | Age: 25
End: 2025-07-29
Payer: COMMERCIAL

## 2025-07-29 NOTE — TELEPHONE ENCOUNTER
Called regarding MyChart message sent to PAC clinic requesting to approve surgery scheduling at the [facility] instead of [facility]     Spoke with: Misty Hernandez with our nurse on the line as well    A message was sent to [facility] anesthesia leadership requesting review, and they will advise on whether surgery can be rescheduled at the [facility] or if surgery must be rescheduled at the [facility] OR     Informed Misty Hernandez that we may not have an answer for 1-2 weeks, but we will notify them once we receive this    Misty expressed understanding and will await follow up     Nancy Marcum on 7/29/2025 at 5:13 PM

## 2025-07-31 ENCOUNTER — TELEPHONE (OUTPATIENT)
Dept: PLASTIC SURGERY | Facility: CLINIC | Age: 25
End: 2025-07-31
Payer: COMMERCIAL

## 2025-07-31 NOTE — TELEPHONE ENCOUNTER
Called to reschedule surgery with: Dr. Bourne    Spoke with: Abhay     Date of Surgery: 8/28/25    Estimated Arrival time Discussed with Patient:  No    Location of surgery: [facility]  *location per PAC & Jenna Nance CRNA - [facility] anesthesia leadership    Pre-operative H&P: video PAC visit 8/19/25 at 10:15 AM     Additional Appointments: n/a - consult with Dr. Bourne not necessary per Abhay    Post-operative Appointment w/ODIN or Surgeon: Dr. Bourne 9/9/25 at 11:00 AM    Discussed with patient PAC RN will provide arrival time and instructions for surgery at the time of the appointment: [[place] locations only]: Yes    Standard Surgery Packet: No not needed per patient      Additional Comments: Robin from financial team to follow up with Abhay to finalize total cost, payment collection, and discuss what is included in the quote versus not included (ie anesthesia).       All patients questions were answered and was instructed to contact the clinic with any questions or concerns.     Nancy Marcum on 7/31/2025 at 11:56 AM

## 2025-08-01 ENCOUNTER — PREP FOR PROCEDURE (OUTPATIENT)
Dept: PLASTIC SURGERY | Facility: CLINIC | Age: 25
End: 2025-08-01
Payer: COMMERCIAL

## 2025-08-04 ENCOUNTER — VIRTUAL VISIT (OUTPATIENT)
Dept: FAMILY MEDICINE | Facility: CLINIC | Age: 25
End: 2025-08-04
Payer: COMMERCIAL

## 2025-08-04 DIAGNOSIS — N89.8 VAGINAL ODOR: Primary | ICD-10-CM

## 2025-08-04 PROCEDURE — 98005 SYNCH AUDIO-VIDEO EST LOW 20: CPT

## 2025-08-19 ENCOUNTER — PRE VISIT (OUTPATIENT)
Dept: SURGERY | Facility: CLINIC | Age: 25
End: 2025-08-19

## 2025-08-27 ASSESSMENT — ENCOUNTER SYMPTOMS: SEIZURES: 0

## 2025-08-28 ENCOUNTER — ANESTHESIA (OUTPATIENT)
Dept: SURGERY | Facility: CLINIC | Age: 25
End: 2025-08-28

## 2025-08-28 ENCOUNTER — HOSPITAL ENCOUNTER (OUTPATIENT)
Facility: CLINIC | Age: 25
Discharge: HOME | End: 2025-08-28
Attending: PLASTIC SURGERY | Admitting: PLASTIC SURGERY

## 2025-08-28 VITALS
TEMPERATURE: 97.2 F | SYSTOLIC BLOOD PRESSURE: 102 MMHG | HEART RATE: 74 BPM | HEIGHT: 62 IN | BODY MASS INDEX: 31.4 KG/M2 | DIASTOLIC BLOOD PRESSURE: 67 MMHG | RESPIRATION RATE: 16 BRPM | OXYGEN SATURATION: 97 % | WEIGHT: 170.64 LBS

## 2025-08-28 DIAGNOSIS — Z98.890 S/P BILATERAL BREAST REDUCTION: Primary | ICD-10-CM

## 2025-08-28 PROCEDURE — 370N000017 HC ANESTHESIA TECHNICAL FEE, PER MIN: Performed by: PLASTIC SURGERY

## 2025-08-28 PROCEDURE — 710N000012 HC RECOVERY PHASE 2, PER MINUTE: Performed by: PLASTIC SURGERY

## 2025-08-28 PROCEDURE — 258N000003 HC RX IP 258 OP 636

## 2025-08-28 PROCEDURE — 250N000009 HC RX 250

## 2025-08-28 PROCEDURE — 250N000011 HC RX IP 250 OP 636: Performed by: PLASTIC SURGERY

## 2025-08-28 PROCEDURE — 250N000011 HC RX IP 250 OP 636

## 2025-08-28 PROCEDURE — 250N000025 HC SEVOFLURANE, PER MIN: Performed by: PLASTIC SURGERY

## 2025-08-28 PROCEDURE — 272N000001 HC OR GENERAL SUPPLY STERILE: Performed by: PLASTIC SURGERY

## 2025-08-28 PROCEDURE — 999N000141 HC STATISTIC PRE-PROCEDURE NURSING ASSESSMENT: Performed by: PLASTIC SURGERY

## 2025-08-28 PROCEDURE — 250N000013 HC RX MED GY IP 250 OP 250 PS 637

## 2025-08-28 PROCEDURE — 710N000010 HC RECOVERY PHASE 1, LEVEL 2, PER MIN: Performed by: PLASTIC SURGERY

## 2025-08-28 PROCEDURE — 360N000076 HC SURGERY LEVEL 3, PER MIN: Performed by: PLASTIC SURGERY

## 2025-08-28 RX ORDER — OXYCODONE HYDROCHLORIDE 5 MG/1
5-10 TABLET ORAL EVERY 6 HOURS PRN
Qty: 20 TABLET | Refills: 0 | Status: SHIPPED | OUTPATIENT
Start: 2025-08-28 | End: 2025-08-28

## 2025-08-28 RX ORDER — ONDANSETRON 4 MG/1
4 TABLET, ORALLY DISINTEGRATING ORAL EVERY 8 HOURS PRN
Qty: 4 TABLET | Refills: 0 | Status: SHIPPED | OUTPATIENT
Start: 2025-08-28

## 2025-08-28 RX ORDER — ACETAMINOPHEN 325 MG/1
975 TABLET ORAL ONCE
Status: COMPLETED | OUTPATIENT
Start: 2025-08-28 | End: 2025-08-28

## 2025-08-28 RX ORDER — HYDRALAZINE HYDROCHLORIDE 20 MG/ML
2.5-5 INJECTION INTRAMUSCULAR; INTRAVENOUS EVERY 10 MIN PRN
Status: DISCONTINUED | OUTPATIENT
Start: 2025-08-28 | End: 2025-08-28 | Stop reason: HOSPADM

## 2025-08-28 RX ORDER — OXYCODONE HYDROCHLORIDE 10 MG/1
10 TABLET ORAL
Status: COMPLETED | OUTPATIENT
Start: 2025-08-28 | End: 2025-08-28

## 2025-08-28 RX ORDER — ONDANSETRON 2 MG/ML
4 INJECTION INTRAMUSCULAR; INTRAVENOUS EVERY 30 MIN PRN
Status: DISCONTINUED | OUTPATIENT
Start: 2025-08-28 | End: 2025-08-28 | Stop reason: HOSPADM

## 2025-08-28 RX ORDER — FENTANYL CITRATE 50 UG/ML
INJECTION, SOLUTION INTRAMUSCULAR; INTRAVENOUS PRN
Status: DISCONTINUED | OUTPATIENT
Start: 2025-08-28 | End: 2025-08-28

## 2025-08-28 RX ORDER — PROPOFOL 10 MG/ML
INJECTION, EMULSION INTRAVENOUS CONTINUOUS PRN
Status: DISCONTINUED | OUTPATIENT
Start: 2025-08-28 | End: 2025-08-28

## 2025-08-28 RX ORDER — HYDROMORPHONE HCL IN WATER/PF 6 MG/30 ML
0.4 PATIENT CONTROLLED ANALGESIA SYRINGE INTRAVENOUS EVERY 5 MIN PRN
Status: DISCONTINUED | OUTPATIENT
Start: 2025-08-28 | End: 2025-08-28 | Stop reason: HOSPADM

## 2025-08-28 RX ORDER — BUPIVACAINE HYDROCHLORIDE 2.5 MG/ML
INJECTION, SOLUTION INFILTRATION; PERINEURAL PRN
Status: DISCONTINUED | OUTPATIENT
Start: 2025-08-28 | End: 2025-08-28 | Stop reason: HOSPADM

## 2025-08-28 RX ORDER — FAMOTIDINE 10 MG
10 TABLET ORAL ONCE
Status: COMPLETED | OUTPATIENT
Start: 2025-08-28 | End: 2025-08-28

## 2025-08-28 RX ORDER — GLYCOPYRROLATE 0.2 MG/ML
INJECTION, SOLUTION INTRAMUSCULAR; INTRAVENOUS PRN
Status: DISCONTINUED | OUTPATIENT
Start: 2025-08-28 | End: 2025-08-28

## 2025-08-28 RX ORDER — CEFAZOLIN SODIUM/WATER 2 G/20 ML
2 SYRINGE (ML) INTRAVENOUS SEE ADMIN INSTRUCTIONS
Status: DISCONTINUED | OUTPATIENT
Start: 2025-08-28 | End: 2025-08-28 | Stop reason: HOSPADM

## 2025-08-28 RX ORDER — SODIUM CHLORIDE, SODIUM LACTATE, POTASSIUM CHLORIDE, CALCIUM CHLORIDE 600; 310; 30; 20 MG/100ML; MG/100ML; MG/100ML; MG/100ML
INJECTION, SOLUTION INTRAVENOUS CONTINUOUS
Status: DISCONTINUED | OUTPATIENT
Start: 2025-08-28 | End: 2025-08-28 | Stop reason: HOSPADM

## 2025-08-28 RX ORDER — CEFAZOLIN SODIUM/WATER 2 G/20 ML
2 SYRINGE (ML) INTRAVENOUS
Status: COMPLETED | OUTPATIENT
Start: 2025-08-28 | End: 2025-08-28

## 2025-08-28 RX ORDER — LIDOCAINE HYDROCHLORIDE 20 MG/ML
INJECTION, SOLUTION INFILTRATION; PERINEURAL PRN
Status: DISCONTINUED | OUTPATIENT
Start: 2025-08-28 | End: 2025-08-28

## 2025-08-28 RX ORDER — ONDANSETRON 4 MG/1
4 TABLET, ORALLY DISINTEGRATING ORAL EVERY 8 HOURS PRN
Qty: 4 TABLET | Refills: 0 | Status: SHIPPED | OUTPATIENT
Start: 2025-08-28 | End: 2025-08-28

## 2025-08-28 RX ORDER — OXYCODONE HYDROCHLORIDE 5 MG/1
5-10 TABLET ORAL EVERY 6 HOURS PRN
Qty: 20 TABLET | Refills: 0 | Status: SHIPPED | OUTPATIENT
Start: 2025-08-28

## 2025-08-28 RX ORDER — ONDANSETRON 2 MG/ML
INJECTION INTRAMUSCULAR; INTRAVENOUS PRN
Status: DISCONTINUED | OUTPATIENT
Start: 2025-08-28 | End: 2025-08-28

## 2025-08-28 RX ORDER — NALOXONE HYDROCHLORIDE 0.4 MG/ML
0.1 INJECTION, SOLUTION INTRAMUSCULAR; INTRAVENOUS; SUBCUTANEOUS
Status: DISCONTINUED | OUTPATIENT
Start: 2025-08-28 | End: 2025-08-28 | Stop reason: HOSPADM

## 2025-08-28 RX ORDER — DEXAMETHASONE SODIUM PHOSPHATE 4 MG/ML
4 INJECTION, SOLUTION INTRA-ARTICULAR; INTRALESIONAL; INTRAMUSCULAR; INTRAVENOUS; SOFT TISSUE
Status: DISCONTINUED | OUTPATIENT
Start: 2025-08-28 | End: 2025-08-28 | Stop reason: HOSPADM

## 2025-08-28 RX ORDER — ONDANSETRON 4 MG/1
4 TABLET, ORALLY DISINTEGRATING ORAL EVERY 30 MIN PRN
Status: DISCONTINUED | OUTPATIENT
Start: 2025-08-28 | End: 2025-08-28 | Stop reason: HOSPADM

## 2025-08-28 RX ORDER — LIDOCAINE 40 MG/G
CREAM TOPICAL
Status: DISCONTINUED | OUTPATIENT
Start: 2025-08-28 | End: 2025-08-28 | Stop reason: HOSPADM

## 2025-08-28 RX ORDER — HYDROMORPHONE HCL IN WATER/PF 6 MG/30 ML
0.2 PATIENT CONTROLLED ANALGESIA SYRINGE INTRAVENOUS EVERY 5 MIN PRN
Status: DISCONTINUED | OUTPATIENT
Start: 2025-08-28 | End: 2025-08-28 | Stop reason: HOSPADM

## 2025-08-28 RX ORDER — DEXAMETHASONE SODIUM PHOSPHATE 4 MG/ML
INJECTION, SOLUTION INTRA-ARTICULAR; INTRALESIONAL; INTRAMUSCULAR; INTRAVENOUS; SOFT TISSUE PRN
Status: DISCONTINUED | OUTPATIENT
Start: 2025-08-28 | End: 2025-08-28

## 2025-08-28 RX ORDER — LABETALOL HYDROCHLORIDE 5 MG/ML
10 INJECTION, SOLUTION INTRAVENOUS
Status: DISCONTINUED | OUTPATIENT
Start: 2025-08-28 | End: 2025-08-28 | Stop reason: HOSPADM

## 2025-08-28 RX ORDER — FENTANYL CITRATE 50 UG/ML
25 INJECTION, SOLUTION INTRAMUSCULAR; INTRAVENOUS EVERY 5 MIN PRN
Status: DISCONTINUED | OUTPATIENT
Start: 2025-08-28 | End: 2025-08-28 | Stop reason: HOSPADM

## 2025-08-28 RX ORDER — AMOXICILLIN 250 MG
1-2 CAPSULE ORAL 2 TIMES DAILY
Qty: 30 TABLET | Refills: 0 | Status: SHIPPED | OUTPATIENT
Start: 2025-08-28

## 2025-08-28 RX ORDER — OXYCODONE HYDROCHLORIDE 5 MG/1
5 TABLET ORAL
Status: DISCONTINUED | OUTPATIENT
Start: 2025-08-28 | End: 2025-08-28 | Stop reason: HOSPADM

## 2025-08-28 RX ORDER — PROPOFOL 10 MG/ML
INJECTION, EMULSION INTRAVENOUS PRN
Status: DISCONTINUED | OUTPATIENT
Start: 2025-08-28 | End: 2025-08-28

## 2025-08-28 RX ORDER — SODIUM CHLORIDE, SODIUM LACTATE, POTASSIUM CHLORIDE, CALCIUM CHLORIDE 600; 310; 30; 20 MG/100ML; MG/100ML; MG/100ML; MG/100ML
INJECTION, SOLUTION INTRAVENOUS CONTINUOUS PRN
Status: DISCONTINUED | OUTPATIENT
Start: 2025-08-28 | End: 2025-08-28

## 2025-08-28 RX ORDER — FENTANYL CITRATE 50 UG/ML
50 INJECTION, SOLUTION INTRAMUSCULAR; INTRAVENOUS EVERY 5 MIN PRN
Status: DISCONTINUED | OUTPATIENT
Start: 2025-08-28 | End: 2025-08-28 | Stop reason: HOSPADM

## 2025-08-28 RX ORDER — AMOXICILLIN 250 MG
1-2 CAPSULE ORAL 2 TIMES DAILY
Qty: 30 TABLET | Refills: 0 | Status: SHIPPED | OUTPATIENT
Start: 2025-08-28 | End: 2025-08-28

## 2025-08-28 RX ADMIN — FENTANYL CITRATE 50 MCG: 50 INJECTION, SOLUTION INTRAMUSCULAR; INTRAVENOUS at 10:02

## 2025-08-28 RX ADMIN — OXYCODONE HYDROCHLORIDE 10 MG: 10 TABLET ORAL at 10:32

## 2025-08-28 RX ADMIN — Medication 100 MG: at 08:52

## 2025-08-28 RX ADMIN — DEXMEDETOMIDINE HYDROCHLORIDE 8 MCG: 100 INJECTION, SOLUTION INTRAVENOUS at 08:00

## 2025-08-28 RX ADMIN — Medication 2 G: at 07:46

## 2025-08-28 RX ADMIN — PHENYLEPHRINE HYDROCHLORIDE 100 MCG: 10 INJECTION INTRAVENOUS at 07:55

## 2025-08-28 RX ADMIN — FENTANYL CITRATE 50 MCG: 50 INJECTION INTRAMUSCULAR; INTRAVENOUS at 08:05

## 2025-08-28 RX ADMIN — FENTANYL CITRATE 25 MCG: 50 INJECTION INTRAMUSCULAR; INTRAVENOUS at 09:20

## 2025-08-28 RX ADMIN — ACETAMINOPHEN 975 MG: 325 TABLET ORAL at 06:47

## 2025-08-28 RX ADMIN — FENTANYL CITRATE 25 MCG: 50 INJECTION, SOLUTION INTRAMUSCULAR; INTRAVENOUS at 09:41

## 2025-08-28 RX ADMIN — MIDAZOLAM 1 MG: 1 INJECTION INTRAMUSCULAR; INTRAVENOUS at 07:32

## 2025-08-28 RX ADMIN — DEXAMETHASONE SODIUM PHOSPHATE 8 MG: 4 INJECTION, SOLUTION INTRAMUSCULAR; INTRAVENOUS at 07:59

## 2025-08-28 RX ADMIN — HYDROMORPHONE HYDROCHLORIDE 0.5 MG: 1 INJECTION, SOLUTION INTRAMUSCULAR; INTRAVENOUS; SUBCUTANEOUS at 08:06

## 2025-08-28 RX ADMIN — PHENYLEPHRINE HYDROCHLORIDE 50 MCG: 10 INJECTION INTRAVENOUS at 08:40

## 2025-08-28 RX ADMIN — GLYCOPYRROLATE 0.2 MG: 0.2 INJECTION, SOLUTION INTRAMUSCULAR; INTRAVENOUS at 07:57

## 2025-08-28 RX ADMIN — FOSAPREPITANT 150 MG: 150 INJECTION, POWDER, LYOPHILIZED, FOR SOLUTION INTRAVENOUS at 06:48

## 2025-08-28 RX ADMIN — ONDANSETRON 4 MG: 2 INJECTION INTRAMUSCULAR; INTRAVENOUS at 08:52

## 2025-08-28 RX ADMIN — PROPOFOL 30 MG: 10 INJECTION, EMULSION INTRAVENOUS at 08:55

## 2025-08-28 RX ADMIN — LIDOCAINE HYDROCHLORIDE 80 MG: 20 INJECTION, SOLUTION INFILTRATION; PERINEURAL at 07:45

## 2025-08-28 RX ADMIN — PROPOFOL 150 MG: 10 INJECTION, EMULSION INTRAVENOUS at 07:45

## 2025-08-28 RX ADMIN — Medication 70 MG: at 07:45

## 2025-08-28 RX ADMIN — SODIUM CHLORIDE, SODIUM LACTATE, POTASSIUM CHLORIDE, AND CALCIUM CHLORIDE: .6; .31; .03; .02 INJECTION, SOLUTION INTRAVENOUS at 07:31

## 2025-08-28 RX ADMIN — DEXMEDETOMIDINE HYDROCHLORIDE 8 MCG: 100 INJECTION, SOLUTION INTRAVENOUS at 08:04

## 2025-08-28 RX ADMIN — FENTANYL CITRATE 25 MCG: 50 INJECTION, SOLUTION INTRAMUSCULAR; INTRAVENOUS at 09:48

## 2025-08-28 RX ADMIN — FENTANYL CITRATE 150 MCG: 50 INJECTION INTRAMUSCULAR; INTRAVENOUS at 07:45

## 2025-08-28 RX ADMIN — HYDROMORPHONE HYDROCHLORIDE 0.4 MG: 0.2 INJECTION, SOLUTION INTRAMUSCULAR; INTRAVENOUS; SUBCUTANEOUS at 10:13

## 2025-08-28 RX ADMIN — FAMOTIDINE 10 MG: 10 TABLET ORAL at 07:25

## 2025-08-28 RX ADMIN — PROPOFOL 30 MCG/KG/MIN: 10 INJECTION, EMULSION INTRAVENOUS at 07:48

## 2025-08-28 RX ADMIN — MIDAZOLAM 1 MG: 1 INJECTION INTRAMUSCULAR; INTRAVENOUS at 07:35

## 2025-08-28 ASSESSMENT — ACTIVITIES OF DAILY LIVING (ADL)
ADLS_ACUITY_SCORE: 35

## (undated) DEVICE — STPL SKIN 35W ROTATING HEAD PRW35

## (undated) DEVICE — DRAPE U SPLIT 74X120" 29440

## (undated) DEVICE — ESU ELEC BLADE HEX-LOCKING 2.5" E1450X

## (undated) DEVICE — SPONGE LAP 18X18" X8435

## (undated) DEVICE — FILTER HEPA FLUID TRAP NEPTUNE 0703-040-001

## (undated) DEVICE — SYR BULB IRRIG DOVER 60 ML LATEX FREE 67000

## (undated) DEVICE — ESU PENCIL SMOKE EVAC W/ROCKER SWITCH 0703-047-000

## (undated) DEVICE — GLOVE BIOGEL PI MICRO INDICATOR UNDERGLOVE SZ 7.5 48975

## (undated) DEVICE — SPONGE LAP 18X18" 23250-400A

## (undated) DEVICE — SYR 10ML FINGER CONTROL W/O NDL 309695

## (undated) DEVICE — SUCTION TIP YANKAUER W/O VENT K86

## (undated) DEVICE — SOL NACL 0.9% IRRIG 1000ML BOTTLE 2F7124

## (undated) DEVICE — SU MONOCRYL 2-0 SH 27" UND Y417H

## (undated) DEVICE — Device

## (undated) DEVICE — GLOVE BIOGEL PI MICRO SZ 7.0 48570

## (undated) DEVICE — DRSG KERLIX 4 1/2"X4YDS ROLL 6715

## (undated) DEVICE — SU DERMABOND PRINEO 42CM CLR422US

## (undated) DEVICE — DRAPE ISOLATION BAG 1003

## (undated) DEVICE — SU STRATAFIX MONOCRYL 3-0 SPIRAL PS-2 45CM SXMP1B107

## (undated) DEVICE — SUCTION MANIFOLD NEPTUNE 2 SYS 4 PORT 0702-020-000

## (undated) DEVICE — DRAPE IOBAN INCISE 23X17" 6650EZ

## (undated) DEVICE — DRSG DRAIN 2X2" 7087

## (undated) DEVICE — LABEL MEDICATION SYSTEM 3303-P

## (undated) DEVICE — ESU GROUND PAD ADULT W/CORD E7507

## (undated) DEVICE — DRSG ABDOMINAL 07 1/2X8" 7197D

## (undated) DEVICE — PREP CHLORAPREP 26ML TINTED HI-LITE ORANGE 930815

## (undated) DEVICE — SU VICRYL+ 0 54 VIO VCP615H

## (undated) DEVICE — BLADE KNIFE SURG 10 371110

## (undated) DEVICE — CATH TRAY FOLEY SURESTEP 16FR W/URNE MTR STLK LATEX A303316A

## (undated) DEVICE — DECANTER VIAL 2006S

## (undated) DEVICE — SYR 10ML LL W/O NDL 302995

## (undated) DEVICE — SOL WATER IRRIG 1000ML BOTTLE 2F7114

## (undated) DEVICE — LINEN TOWEL PACK X6 WHITE 5487

## (undated) DEVICE — LINEN TOWEL PACK X30 5481

## (undated) DEVICE — SU PLAIN FAST ABSORB 5-0 PC-1 18" 1915G

## (undated) RX ORDER — ONDANSETRON 2 MG/ML
INJECTION INTRAMUSCULAR; INTRAVENOUS
Status: DISPENSED
Start: 2024-07-25

## (undated) RX ORDER — ACETAMINOPHEN 325 MG/1
TABLET ORAL
Status: DISPENSED
Start: 2024-07-25

## (undated) RX ORDER — OXYCODONE HYDROCHLORIDE 5 MG/1
TABLET ORAL
Status: DISPENSED
Start: 2024-07-25

## (undated) RX ORDER — FENTANYL CITRATE 50 UG/ML
INJECTION, SOLUTION INTRAMUSCULAR; INTRAVENOUS
Status: DISPENSED
Start: 2025-08-28

## (undated) RX ORDER — PROPOFOL 10 MG/ML
INJECTION, EMULSION INTRAVENOUS
Status: DISPENSED
Start: 2025-08-28

## (undated) RX ORDER — BUPIVACAINE HYDROCHLORIDE 2.5 MG/ML
INJECTION, SOLUTION EPIDURAL; INFILTRATION; INTRACAUDAL; PERINEURAL
Status: DISPENSED
Start: 2025-08-28

## (undated) RX ORDER — FENTANYL CITRATE 50 UG/ML
INJECTION, SOLUTION INTRAMUSCULAR; INTRAVENOUS
Status: DISPENSED
Start: 2024-07-25

## (undated) RX ORDER — CEFAZOLIN SODIUM/WATER 2 G/20 ML
SYRINGE (ML) INTRAVENOUS
Status: DISPENSED
Start: 2025-08-28

## (undated) RX ORDER — EPHEDRINE SULFATE 50 MG/ML
INJECTION, SOLUTION INTRAMUSCULAR; INTRAVENOUS; SUBCUTANEOUS
Status: DISPENSED
Start: 2024-07-25

## (undated) RX ORDER — HYDROMORPHONE HYDROCHLORIDE 1 MG/ML
INJECTION, SOLUTION INTRAMUSCULAR; INTRAVENOUS; SUBCUTANEOUS
Status: DISPENSED
Start: 2025-08-28

## (undated) RX ORDER — PROPOFOL 10 MG/ML
INJECTION, EMULSION INTRAVENOUS
Status: DISPENSED
Start: 2024-07-25

## (undated) RX ORDER — HYDROMORPHONE HYDROCHLORIDE 1 MG/ML
INJECTION, SOLUTION INTRAMUSCULAR; INTRAVENOUS; SUBCUTANEOUS
Status: DISPENSED
Start: 2024-07-25

## (undated) RX ORDER — CALCIUM CHLORIDE 100 MG/ML
INJECTION INTRAVENOUS; INTRAVENTRICULAR
Status: DISPENSED
Start: 2024-07-25

## (undated) RX ORDER — CEFAZOLIN SODIUM/WATER 2 G/20 ML
SYRINGE (ML) INTRAVENOUS
Status: DISPENSED
Start: 2024-07-25

## (undated) RX ORDER — OXYCODONE HYDROCHLORIDE 10 MG/1
TABLET ORAL
Status: DISPENSED
Start: 2025-08-28

## (undated) RX ORDER — FENTANYL CITRATE-0.9 % NACL/PF 10 MCG/ML
PLASTIC BAG, INJECTION (ML) INTRAVENOUS
Status: DISPENSED
Start: 2024-07-25

## (undated) RX ORDER — DEXAMETHASONE SODIUM PHOSPHATE 4 MG/ML
INJECTION, SOLUTION INTRA-ARTICULAR; INTRALESIONAL; INTRAMUSCULAR; INTRAVENOUS; SOFT TISSUE
Status: DISPENSED
Start: 2025-08-28

## (undated) RX ORDER — MINERAL OIL
OIL (ML) MISCELLANEOUS
Status: DISPENSED
Start: 2024-07-25

## (undated) RX ORDER — BUPIVACAINE HYDROCHLORIDE 2.5 MG/ML
INJECTION, SOLUTION EPIDURAL; INFILTRATION; INTRACAUDAL
Status: DISPENSED
Start: 2024-07-25

## (undated) RX ORDER — DEXAMETHASONE SODIUM PHOSPHATE 4 MG/ML
INJECTION, SOLUTION INTRA-ARTICULAR; INTRALESIONAL; INTRAMUSCULAR; INTRAVENOUS; SOFT TISSUE
Status: DISPENSED
Start: 2024-07-25

## (undated) RX ORDER — CEFAZOLIN SODIUM 1 G/3ML
INJECTION, POWDER, FOR SOLUTION INTRAMUSCULAR; INTRAVENOUS
Status: DISPENSED
Start: 2024-07-25

## (undated) RX ORDER — ACETAMINOPHEN 325 MG/1
TABLET ORAL
Status: DISPENSED
Start: 2025-08-28

## (undated) RX ORDER — HYDROMORPHONE HCL IN WATER/PF 6 MG/30 ML
PATIENT CONTROLLED ANALGESIA SYRINGE INTRAVENOUS
Status: DISPENSED
Start: 2025-08-28

## (undated) RX ORDER — ONDANSETRON 2 MG/ML
INJECTION INTRAMUSCULAR; INTRAVENOUS
Status: DISPENSED
Start: 2025-08-28